# Patient Record
Sex: MALE | Race: WHITE | NOT HISPANIC OR LATINO | Employment: OTHER | URBAN - NONMETROPOLITAN AREA
[De-identification: names, ages, dates, MRNs, and addresses within clinical notes are randomized per-mention and may not be internally consistent; named-entity substitution may affect disease eponyms.]

---

## 2018-07-03 ENCOUNTER — TELEPHONE (OUTPATIENT)
Dept: SURGERY | Facility: CLINIC | Age: 53
End: 2018-07-03

## 2018-07-06 ENCOUNTER — PREP FOR SURGERY (OUTPATIENT)
Dept: OTHER | Facility: HOSPITAL | Age: 53
End: 2018-07-06

## 2018-07-06 DIAGNOSIS — Z12.11 ENCOUNTER FOR SCREENING COLONOSCOPY: Primary | ICD-10-CM

## 2018-07-06 RX ORDER — BISACODYL 5 MG/1
5 TABLET, DELAYED RELEASE ORAL DAILY
Qty: 4 TABLET | Refills: 0 | Status: SHIPPED | OUTPATIENT
Start: 2018-07-06 | End: 2018-10-02 | Stop reason: HOSPADM

## 2018-07-06 RX ORDER — POLYETHYLENE GLYCOL 3350 17 G/17G
238 POWDER, FOR SOLUTION ORAL ONCE
Qty: 14 PACKET | Refills: 0 | Status: SHIPPED | OUTPATIENT
Start: 2018-07-06 | End: 2018-07-06

## 2018-07-17 PROBLEM — Z12.11 ENCOUNTER FOR SCREENING COLONOSCOPY: Status: ACTIVE | Noted: 2018-07-17

## 2018-09-10 ENCOUNTER — TELEPHONE (OUTPATIENT)
Dept: SURGERY | Facility: CLINIC | Age: 53
End: 2018-09-10

## 2018-09-10 NOTE — TELEPHONE ENCOUNTER
Called the patient to remind them of an upcoming appointment with general surgery. I was able to reach to reach the patient. confirmed

## 2018-09-11 ENCOUNTER — TELEPHONE (OUTPATIENT)
Dept: SURGERY | Facility: CLINIC | Age: 53
End: 2018-09-11

## 2018-09-11 RX ORDER — SERTRALINE HYDROCHLORIDE 25 MG/1
25 TABLET, FILM COATED ORAL DAILY
COMMUNITY
End: 2020-10-21 | Stop reason: DRUGHIGH

## 2018-09-11 RX ORDER — LISINOPRIL 40 MG/1
40 TABLET ORAL DAILY
COMMUNITY
End: 2020-10-02

## 2018-10-02 ENCOUNTER — HOSPITAL ENCOUNTER (OUTPATIENT)
Facility: HOSPITAL | Age: 53
Setting detail: HOSPITAL OUTPATIENT SURGERY
Discharge: HOME | End: 2018-10-02
Attending: SURGERY | Admitting: SURGERY

## 2018-10-02 ENCOUNTER — ANESTHESIA (OUTPATIENT)
Dept: GASTROENTEROLOGY | Facility: HOSPITAL | Age: 53
End: 2018-10-02

## 2018-10-02 ENCOUNTER — ANESTHESIA EVENT (OUTPATIENT)
Dept: GASTROENTEROLOGY | Facility: HOSPITAL | Age: 53
End: 2018-10-02

## 2018-10-02 VITALS
DIASTOLIC BLOOD PRESSURE: 79 MMHG | BODY MASS INDEX: 38.36 KG/M2 | RESPIRATION RATE: 18 BRPM | OXYGEN SATURATION: 94 % | HEART RATE: 85 BPM | TEMPERATURE: 97.8 F | SYSTOLIC BLOOD PRESSURE: 122 MMHG | HEIGHT: 69 IN | WEIGHT: 259 LBS

## 2018-10-02 DIAGNOSIS — Z12.11 ENCOUNTER FOR SCREENING COLONOSCOPY: ICD-10-CM

## 2018-10-02 LAB — GLUCOSE BLDC GLUCOMTR-MCNC: 147 MG/DL (ref 70–130)

## 2018-10-02 PROCEDURE — 82962 GLUCOSE BLOOD TEST: CPT

## 2018-10-02 PROCEDURE — S0260 H&P FOR SURGERY: HCPCS | Performed by: SURGERY

## 2018-10-02 PROCEDURE — 25010000002 PROPOFOL 10 MG/ML EMULSION: Performed by: NURSE ANESTHETIST, CERTIFIED REGISTERED

## 2018-10-02 RX ORDER — ONDANSETRON 2 MG/ML
4 INJECTION INTRAMUSCULAR; INTRAVENOUS ONCE AS NEEDED
Status: DISCONTINUED | OUTPATIENT
Start: 2018-10-02 | End: 2018-10-02 | Stop reason: HOSPADM

## 2018-10-02 RX ORDER — LIDOCAINE HYDROCHLORIDE 20 MG/ML
INJECTION, SOLUTION INFILTRATION; PERINEURAL AS NEEDED
Status: DISCONTINUED | OUTPATIENT
Start: 2018-10-02 | End: 2018-10-02 | Stop reason: SURG

## 2018-10-02 RX ORDER — ONDANSETRON 2 MG/ML
4 INJECTION INTRAMUSCULAR; INTRAVENOUS ONCE AS NEEDED
Status: CANCELLED | OUTPATIENT
Start: 2018-10-02 | End: 2018-10-02

## 2018-10-02 RX ORDER — SODIUM CHLORIDE 9 MG/ML
INJECTION, SOLUTION INTRAVENOUS CONTINUOUS PRN
Status: DISCONTINUED | OUTPATIENT
Start: 2018-10-02 | End: 2018-10-02 | Stop reason: SURG

## 2018-10-02 RX ORDER — PROPOFOL 10 MG/ML
VIAL (ML) INTRAVENOUS AS NEEDED
Status: DISCONTINUED | OUTPATIENT
Start: 2018-10-02 | End: 2018-10-02 | Stop reason: SURG

## 2018-10-02 RX ADMIN — SODIUM CHLORIDE: 9 INJECTION, SOLUTION INTRAVENOUS at 12:09

## 2018-10-02 RX ADMIN — PROPOFOL 500 MG: 10 INJECTION, EMULSION INTRAVENOUS at 12:40

## 2018-10-02 RX ADMIN — LIDOCAINE HYDROCHLORIDE 100 MG: 20 INJECTION, SOLUTION INFILTRATION; PERINEURAL at 12:12

## 2018-10-02 NOTE — H&P
[facility]   HISTORY AND PHYSICAL      Name:  Wagner Hayward   Age:  53 y.o.  Sex:  male  :  1965  MRN:  [id number]   Visit Number:  [id number]  Admission Date:  10/2/2018  Date Of Service:  10/02/18  Primary Care Physician:  Gina Rucker DO    Chief Complaint:     I need a colonoscopy    History Of Presenting Illness:      Patient with a history of polyps and 2 prior colonoscopies.  Last colonoscopy 5 years ago.  Here for repeat colonoscopy due to his history of colon polyps.  No significant family history for colon cancer    Review Of Systems:     General ROS: Patient denies any fevers, chills or loss of consciousness.  No complaints of generalized weakness  Psychological ROS: Denies any hallucinations and delusions.  Respiratory ROS: Denies cough or shortness of breath.   Cardiovascular ROS: Denies chest pain or palpitations. No history of exertional chest pain.   Gastrointestinal ROS: Denies nausea and vomiting. Denies any abdominal pain. No diarrhea.   Genito-Urinary ROS: Denies dysuria or hematuria.  Neurological ROS: Denies any focal weakness. No loss of consciousness. Denies any numbness.   Dermatological ROS: Denies any redness or pruritis.     Past Medical History:    Past Medical History:   Diagnosis Date   • Anxiety    • Arthritis    • Colon polyp    • Diabetes mellitus (CMS/HCC)    • H/O exercise stress test    • History of transfusion     NO REACTIONS   • Hypertension    • Kidney stones    • MRSA (methicillin resistant Staphylococcus aureus) infection     8 MONTHS AGO ON NECK-TREATED WITH ABX. PT REPORTS HEALED   • Wears glasses     READING       Past Surgical history:    Past Surgical History:   Procedure Laterality Date   • ANAL FISSURECTOMY     • CHOLECYSTECTOMY     • COLONOSCOPY     • CYSTOSCOPY W/ LASER LITHOTRIPSY         Social History:    Social History     Social History   • Marital status:      Spouse name: N/A   • Number of children: N/A    • Years of education: N/A     Occupational History   • Not on file.     Social History Main Topics   • Smoking status: Former Smoker     Packs/day: 1.00     Years: 17.00     Types: Cigarettes   • Smokeless tobacco: Never Used      Comment: QUIT 20 YEARS AGO   • Alcohol use No   • Drug use: No   • Sexual activity: Defer     Other Topics Concern   • Not on file     Social History Narrative   • No narrative on file       Family History:    History reviewed. No pertinent family history.    Allergies:      Patient has no known allergies.    Home Medications:    Prior to Admission Medications     Prescriptions Last Dose Informant Patient Reported? Taking?    Acetaminophen (TYLENOL ARTHRITIS PAIN PO) 10/1/2018 Self Yes Yes    Take 2 tablets by mouth 2 (Two) Times a Day.    bisacodyl (bisacodyl) 5 MG EC tablet   No Yes    Take 1 tablet by mouth Daily.    bisacodyl (bisacodyl) 5 MG EC tablet   No Yes    Take 1 tablet by mouth Daily.    Dulaglutide (TRULICITY SC) Past Week  Yes Yes    Inject 1 dose under the skin into the appropriate area as directed 1 (One) Time Per Week. PT UNSURE OF DOSAGE    Empagliflozin (JARDIANCE PO) 10/1/2018 Self Yes Yes    Take 1 tablet/day by mouth Daily. PT UNSURE OF DOSAGE    insulin lispro (humaLOG) 100 UNIT/ML injection 10/1/2018 Self Yes Yes    Inject 26 Units under the skin into the appropriate area as directed 2 (Two) Times a Day.    JANUMET  MG per tablet 10/1/2018  No Yes    TAKE 1 TABLET BY MOUTH TWICE DAILY    lisinopril (PRINIVIL,ZESTRIL) 40 MG tablet 10/2/2018 Self Yes Yes    Take 40 mg by mouth Daily.    ONE TOUCH ULTRA TEST test strip 10/1/2018  No Yes    TEST DAILY AS NEEDED    sertraline (ZOLOFT) 25 MG tablet Past Week Self Yes Yes    Take 25 mg by mouth Daily.             ED Medications:    Medications - No data to display    Vital Signs:    Temp:  [97.3 °F (36.3 °C)] 97.3 °F (36.3 °C)  Heart Rate:  [89] 89  Resp:  [18] 18  BP: (161)/(89) 161/89    1    09/11/18  0992    Weight: 117 kg (259 lb)       Body mass index is 38.25 kg/m².    Physical Exam:      General Appearance:  Alert and cooperative, not in any acute distress.   Head:  Atraumatic and normocephalic, without obvious abnormality.   Eyes:          PERRLA, conjunctivae and sclerae normal, no Icterus. No pallor. Extra-occular movements are within normal limits.   Ears:  Ears appear intact with no abnormalities noted.   Respiratory/Lungs:   Breath sounds heard bilaterally equally.  No crackles or wheezing. No Pleural rub or bronchial breathing. Normal respiratory effort.    Cardiovascular/Heart:  Normal S1 and S2,    GI/Abdomen:   No masses, no hepatosplenomegaly. Soft, non-tender, non-distended, no hernia                 Musculoskeletal/ Extremities:   Moves all extremities well   Skin: No bleeding, bruising or rash, no induration   Psychiatric : Alert and oriented ×3.  No depression or anxiety    Neurologic: Cranial nerves 2 - 12 grossly intact, sensation intact, Motor power is normal and equal bilaterally.       EKG:          Labs:    Lab Results (last 24 hours)     Procedure Component Value Units Date/Time    POC Glucose Once [[id number]]  (Abnormal) Collected:  10/02/18 1023    Specimen:  Blood Updated:  10/02/18 1035     Glucose 147 (H) mg/dL      Comment: Serial Number: [id number]:  [id number]             Radiology:    Imaging Results (last 72 hours)     ** No results found for the last 72 hours. **          Assessment:    Screening colonoscopy with history of colon polyps    Plan:     I recommend a screening colonoscopy to the patient.  The patient understands the procedure and the reason for the procedure.  The patient also understands the risks which include but are not limited to bleeding and perforation and they understand the ramifications of these potential complications and wish to proceed.    Elian Reynoso MD  10/02/18  12:12 PM

## 2018-10-02 NOTE — ANESTHESIA PREPROCEDURE EVALUATION
Anesthesia Evaluation     Patient summary reviewed and Nursing notes reviewed   NPO Solid Status: > 8 hours  NPO Liquid Status: > 8 hours           Airway   Mallampati: II  TM distance: >3 FB  Neck ROM: full  No difficulty expected  Dental - normal exam     Pulmonary - normal exam   (+) sleep apnea (multiple risk factors),   Cardiovascular - normal exam    (+) hypertension,       Neuro/Psych  (+) psychiatric history Anxiety,     GI/Hepatic/Renal/Endo    (+) obesity,   diabetes mellitus,     Musculoskeletal     Abdominal  - normal exam    Bowel sounds: normal.   Substance History - negative use     OB/GYN negative ob/gyn ROS         Other   (+) arthritis                     Anesthesia Plan    ASA 2     MAC     intravenous induction   Anesthetic plan, all risks, benefits, and alternatives have been provided, discussed and informed consent has been obtained with: patient.

## 2018-10-02 NOTE — ANESTHESIA POSTPROCEDURE EVALUATION
Patient: Wagner Hayward    Procedure Summary     Date:  10/02/18 Room / Location:  [facility] ENDOSCOPY 2 / [facility] ENDOSCOPY    Anesthesia Start:  1209 Anesthesia Stop:  1243    Procedure:  COLONOSCOPY WITH COLD SNARE POLYPECTOMY (N/A Anus) Diagnosis:       Encounter for screening colonoscopy      (Encounter for screening colonoscopy [Z12.11])    Surgeon:  Elian Reynoso MD Provider:  Kayden Alford CRNA    Anesthesia Type:  MAC ASA Status:  2          Anesthesia Type: MAC  Last vitals  BP   122/79 (10/02/18 1303)   Temp   97.8 °F (36.6 °C) (10/02/18 1243)   Pulse   85 (10/02/18 1303)   Resp   18 (10/02/18 1303)     SpO2   94 % (10/02/18 1303)     Post Anesthesia Care and Evaluation    Patient location during evaluation: PHASE II  Patient participation: complete - patient participated  Level of consciousness: awake  Pain score: 1  Pain management: adequate  Airway patency: patent  Anesthetic complications: No anesthetic complications  PONV Status: controlled  Cardiovascular status: acceptable and stable  Respiratory status: acceptable  Hydration status: acceptable

## 2018-10-05 LAB
LAB AP CASE REPORT: NORMAL
PATH REPORT.FINAL DX SPEC: NORMAL

## 2019-01-14 ENCOUNTER — TRANSCRIBE ORDERS (OUTPATIENT)
Dept: ADMINISTRATIVE | Facility: HOSPITAL | Age: 54
End: 2019-01-14

## 2019-01-14 DIAGNOSIS — I25.10 CAD IN NATIVE ARTERY: Primary | ICD-10-CM

## 2019-01-17 ENCOUNTER — APPOINTMENT (OUTPATIENT)
Dept: NUCLEAR MEDICINE | Facility: HOSPITAL | Age: 54
End: 2019-01-17
Attending: INTERNAL MEDICINE

## 2019-01-17 ENCOUNTER — HOSPITAL ENCOUNTER (OUTPATIENT)
Dept: NUCLEAR MEDICINE | Facility: HOSPITAL | Age: 54
End: 2019-01-17
Attending: INTERNAL MEDICINE

## 2019-01-25 ENCOUNTER — HOSPITAL ENCOUNTER (OUTPATIENT)
Dept: NUCLEAR MEDICINE | Facility: HOSPITAL | Age: 54
Discharge: HOME | End: 2019-01-25
Attending: INTERNAL MEDICINE

## 2019-01-25 DIAGNOSIS — I25.10 CAD IN NATIVE ARTERY: ICD-10-CM

## 2019-01-25 PROCEDURE — A9500 TC99M SESTAMIBI: HCPCS | Performed by: INTERNAL MEDICINE

## 2019-01-25 PROCEDURE — 93017 CV STRESS TEST TRACING ONLY: CPT

## 2019-01-25 PROCEDURE — 0 TECHNETIUM SESTAMIBI: Performed by: INTERNAL MEDICINE

## 2019-01-25 PROCEDURE — 78452 HT MUSCLE IMAGE SPECT MULT: CPT

## 2019-01-25 PROCEDURE — 25010000002 REGADENOSON 0.4 MG/5ML SOLUTION: Performed by: INTERNAL MEDICINE

## 2019-01-25 RX ADMIN — REGADENOSON 0.4 MG: 0.08 INJECTION, SOLUTION INTRAVENOUS at 08:30

## 2019-01-25 RX ADMIN — TECHNETIUM TC 99M SESTAMIBI 1 DOSE: 1 INJECTION INTRAVENOUS at 06:45

## 2019-01-25 RX ADMIN — TECHNETIUM TC 99M SESTAMIBI 1 DOSE: 1 INJECTION INTRAVENOUS at 08:30

## 2019-01-29 LAB
BH CV STRESS COMMENTS STAGE 1: NORMAL
BH CV STRESS DOSE REGADENOSON STAGE 1: 0.4
BH CV STRESS DURATION MIN STAGE 1: 0
BH CV STRESS DURATION SEC STAGE 1: 10
BH CV STRESS PROTOCOL 1: NORMAL
BH CV STRESS RECOVERY BP: NORMAL MMHG
BH CV STRESS RECOVERY HR: 83 BPM
BH CV STRESS STAGE 1: 1
LV EF NUC BP: 53 %
MAXIMAL PREDICTED HEART RATE: 165 BPM
PERCENT MAX PREDICTED HR: 53.33 %
STRESS BASELINE BP: NORMAL MMHG
STRESS BASELINE HR: 73 BPM
STRESS PERCENT HR: 63 %
STRESS POST PEAK BP: NORMAL MMHG
STRESS POST PEAK HR: 88 BPM
STRESS TARGET HR: 140 BPM

## 2020-04-06 ENCOUNTER — TRANSCRIBE ORDERS (OUTPATIENT)
Dept: ADMINISTRATIVE | Facility: HOSPITAL | Age: 55
End: 2020-04-06

## 2020-04-06 DIAGNOSIS — N64.4 MASTODYNIA: Primary | ICD-10-CM

## 2020-04-21 ENCOUNTER — HOSPITAL ENCOUNTER (OUTPATIENT)
Dept: ULTRASOUND IMAGING | Facility: HOSPITAL | Age: 55
Discharge: HOME | End: 2020-04-21

## 2020-04-21 ENCOUNTER — HOSPITAL ENCOUNTER (OUTPATIENT)
Dept: MAMMOGRAPHY | Facility: HOSPITAL | Age: 55
Discharge: HOME | End: 2020-04-21
Admitting: NURSE PRACTITIONER

## 2020-04-21 DIAGNOSIS — N64.4 MASTODYNIA: ICD-10-CM

## 2020-04-21 PROCEDURE — G0279 TOMOSYNTHESIS, MAMMO: HCPCS

## 2020-04-21 PROCEDURE — 76642 ULTRASOUND BREAST LIMITED: CPT

## 2020-04-21 PROCEDURE — 77066 DX MAMMO INCL CAD BI: CPT

## 2020-05-07 ENCOUNTER — APPOINTMENT (OUTPATIENT)
Dept: MAMMOGRAPHY | Facility: HOSPITAL | Age: 55
End: 2020-05-07

## 2020-05-26 RX ORDER — CARVEDILOL 25 MG/1
25 TABLET ORAL 2 TIMES DAILY WITH MEALS
COMMUNITY
Start: 2020-03-31

## 2020-05-26 RX ORDER — MELOXICAM 15 MG/1
TABLET ORAL
COMMUNITY
Start: 2020-04-17 | End: 2020-05-29

## 2020-05-26 RX ORDER — INSULIN LISPRO 100 [IU]/ML
30 INJECTION, SUSPENSION SUBCUTANEOUS 2 TIMES DAILY WITH MEALS
COMMUNITY
Start: 2020-03-30

## 2020-05-26 RX ORDER — SERTRALINE HYDROCHLORIDE 100 MG/1
TABLET, FILM COATED ORAL
COMMUNITY
Start: 2020-04-17 | End: 2020-05-29

## 2020-05-26 RX ORDER — SPIRONOLACTONE AND HYDROCHLOROTHIAZIDE 25; 25 MG/1; MG/1
1 TABLET ORAL DAILY
COMMUNITY
Start: 2020-04-06 | End: 2020-05-29

## 2020-05-26 NOTE — PROGRESS NOTES
Patient: Wagner Hayward    YOB: 1965    Date: 05/29/2020    Primary Care Provider: Gina Rucker DO    Chief Complaint   Patient presents with   • Breast Mass     left        Subjective .     History of present illness: Patient states that he has had about an 8-month history of a left breast mass.  He has had associated pain that is throbbing and can be sharp intermittently.  Touching it makes it worse.  He was in spironolactone for a long time and then stopped the medication and that improved his symptoms.  He restarted the medicine again with recurrence and now for about 8 weeks has stopped the medication without significant relief of his symptoms.  The pain is moderate in intensity.    The following portions of the patient's history were reviewed and updated as appropriate: allergies, current medications, past family history, past medical history, past social history, past surgical history and problem list.    Review of Systems   Constitutional: Negative for chills, fever and unexpected weight change.   HENT: Negative for trouble swallowing and voice change.    Eyes: Negative for visual disturbance.   Respiratory: Negative for apnea, cough, chest tightness, shortness of breath and wheezing.    Cardiovascular: Positive for chest pain and leg swelling. Negative for palpitations.   Gastrointestinal: Negative for abdominal distention, abdominal pain, anal bleeding, blood in stool, constipation, diarrhea, nausea, rectal pain and vomiting.   Endocrine: Negative for cold intolerance and heat intolerance.   Genitourinary: Negative for difficulty urinating, dysuria, flank pain, scrotal swelling and testicular pain.   Musculoskeletal: Negative for back pain, gait problem and joint swelling.   Skin: Negative for color change, rash and wound.   Neurological: Negative for dizziness, syncope, speech difficulty, weakness, numbness and headaches.   Hematological: Negative for adenopathy. Does not  bruise/bleed easily.   Psychiatric/Behavioral: Negative for confusion. The patient is not nervous/anxious.        History:  Past Medical History:   Diagnosis Date   • Anxiety    • Arthritis    • Colon polyp    • Diabetes mellitus (CMS/HCC)    • H/O exercise stress test    • History of transfusion     NO REACTIONS   • Hypertension    • Kidney stones    • MRSA (methicillin resistant Staphylococcus aureus) infection     8 MONTHS AGO ON NECK-TREATED WITH ABX. PT REPORTS HEALED   • Wears glasses     READING          Past Surgical History:   Procedure Laterality Date   • ANAL FISSURECTOMY     • CHOLECYSTECTOMY     • COLONOSCOPY     • COLONOSCOPY N/A 10/2/2018    Procedure: COLONOSCOPY WITH COLD SNARE POLYPECTOMY;  Surgeon: Elian Reynoso MD;  Location: [facility] ENDOSCOPY;  Service: Gastroenterology   • CYSTOSCOPY W/ LASER LITHOTRIPSY         Family History   Problem Relation Age of Onset   • No Known Problems Mother    • Cancer Father         prostate   • Diabetes Father    • Diabetes Sister    • Hypertension Sister    • Hypertension Brother    • Hypertension Brother        Social History     Tobacco Use   • Smoking status: Former Smoker     Packs/day: 1.00     Years: 17.00     Pack years: 17.00     Types: Cigarettes   • Smokeless tobacco: Never Used   • Tobacco comment: QUIT 20 YEARS AGO   Substance Use Topics   • Alcohol use: No   • Drug use: No       Allergies:  No Known Allergies    Medications:     Current Outpatient Medications:   •  carvedilol (COREG) 25 MG tablet, Take 25 mg by mouth 2 (Two) Times a Day With Meals., Disp: , Rfl:   •  HUMALOG MIX 75/25 KWIKPEN (75-25) 100 UNIT/ML suspension pen-injector pen, , Disp: , Rfl:   •  hydroCHLOROthiazide (HYDRODIURIL) 25 MG tablet, , Disp: , Rfl:   •  ibuprofen (ADVIL,MOTRIN) 800 MG tablet, TAKE 1 TABLET BY ORAL ROUTE 2-3 TIMES EVERY DAY WITH FOOD., Disp: , Rfl:   •  insulin lispro (humaLOG) 100 UNIT/ML injection, Inject 26 Units under the skin into the appropriate area  "as directed 2 (Two) Times a Day., Disp: , Rfl:   •  metFORMIN (GLUCOPHAGE) 850 MG tablet, , Disp: , Rfl:   •  pregabalin (LYRICA) 150 MG capsule, TK 1 C PO BID, Disp: , Rfl:   •  sertraline (ZOLOFT) 25 MG tablet, Take 25 mg by mouth Daily., Disp: , Rfl:   •  Acetaminophen (TYLENOL ARTHRITIS PAIN PO), Take 2 tablets by mouth 2 (Two) Times a Day., Disp: , Rfl:   •  Dulaglutide (TRULICITY SC), Inject 1 dose under the skin into the appropriate area as directed 1 (One) Time Per Week. PT UNSURE OF DOSAGE, Disp: , Rfl:   •  Empagliflozin (JARDIANCE PO), Take 1 tablet/day by mouth Daily. PT UNSURE OF DOSAGE, Disp: , Rfl:   •  JANUMET  MG per tablet, TAKE 1 TABLET BY MOUTH TWICE DAILY, Disp: 60 tablet, Rfl: 11  •  lisinopril (PRINIVIL,ZESTRIL) 40 MG tablet, Take 40 mg by mouth Daily., Disp: , Rfl:   •  ONE TOUCH ULTRA TEST test strip, TEST DAILY AS NEEDED, Disp: 100 each, Rfl: 3    Objective     Vital Signs:   Vitals:    05/29/20 0853   BP: 152/100   Pulse: 72   Temp: 98.2 °F (36.8 °C)   TempSrc: Temporal   SpO2: 98%   Weight: (!) 139 kg (305 lb 12.8 oz)   Height: 175.3 cm (69\")       Physical Exam:     General Appearance:    Alert, cooperative, in no acute distress   Head:    Normocephalic, without obvious abnormality, atraumatic   Eyes:            Normal.  No scleral icterus.  PERRLA    Lungs:     Clear to auscultation,respirations regular, even and                  unlabored    Heart:    Regular rhythm and normal rate, normal S1 and S2, no            murmur   Abdomen:     Normal bowel sounds, no masses, no organomegaly, soft        non-tender, non-distended, no guarding,    Extremities:   Moves all extremities well, no edema, no cyanosis, no             redness   Skin:   No bleeding, bruising or rash   Neurologic:   Normal without gross deficits.   Psychiatric: No evidence of depression or anxiety   Breast exam: Patient has evidence of bilateral gynecomastia.  Greater on the left side.  Some tenderness associated " with the gynecomastia on the left side.         Results Review:   I reviewed the patient's new clinical results.  Mammogram and ultrasound were reviewed    Review of Systems was reviewed and confirmed as accurate as documented by the MA.    Assessment / Plan:    1. Gynecomastia        Patient with gynecomastia and pain associated with this mainly on the left side.  Benign findings on ultrasound and mammogram.  At this time of given the patient the option of observation versus resection.  I explained to him that with resection pain may not resolve now also discussed with him the possible risks as well.  With observation symptoms may improve with additional time but certainly this can be ongoing and it is difficult to know exactly how the pain will continue over time.  At this time patient wishes to observe and I think this is reasonable.  I will follow him up in 1 month.  Continue ibuprofen.  Tylenol can be added as well.  Certainly if his symptoms worsen or he changes his mind he will follow-up with me sooner.    Electronically signed by Elian Reynoso MD  05/29/20  09:34        Portions of this note have been scribed for Elian Reynoso MD by Dayana Khan 5/29/2020  09:34

## 2020-05-29 ENCOUNTER — OFFICE VISIT (OUTPATIENT)
Dept: SURGERY | Facility: CLINIC | Age: 55
End: 2020-05-29

## 2020-05-29 VITALS
HEIGHT: 69 IN | BODY MASS INDEX: 45.29 KG/M2 | WEIGHT: 305.8 LBS | HEART RATE: 72 BPM | TEMPERATURE: 98.2 F | OXYGEN SATURATION: 98 % | DIASTOLIC BLOOD PRESSURE: 100 MMHG | SYSTOLIC BLOOD PRESSURE: 152 MMHG

## 2020-05-29 DIAGNOSIS — N62 GYNECOMASTIA: Primary | ICD-10-CM

## 2020-05-29 PROCEDURE — 99214 OFFICE O/P EST MOD 30 MIN: CPT | Performed by: SURGERY

## 2020-05-29 RX ORDER — IBUPROFEN 800 MG/1
TABLET ORAL
COMMUNITY
Start: 2020-04-27 | End: 2020-07-24

## 2020-05-29 RX ORDER — PREGABALIN 150 MG/1
CAPSULE ORAL
COMMUNITY
Start: 2020-04-29 | End: 2020-10-02

## 2020-05-29 RX ORDER — HYDROCHLOROTHIAZIDE 25 MG/1
25 TABLET ORAL DAILY
COMMUNITY
Start: 2020-05-27 | End: 2020-10-02

## 2020-07-09 ENCOUNTER — APPOINTMENT (OUTPATIENT)
Dept: CT IMAGING | Facility: HOSPITAL | Age: 55
End: 2020-07-09

## 2020-07-09 ENCOUNTER — HOSPITAL ENCOUNTER (EMERGENCY)
Facility: HOSPITAL | Age: 55
Discharge: HOME | End: 2020-07-09
Attending: EMERGENCY MEDICINE | Admitting: EMERGENCY MEDICINE

## 2020-07-09 VITALS
RESPIRATION RATE: 18 BRPM | DIASTOLIC BLOOD PRESSURE: 86 MMHG | OXYGEN SATURATION: 95 % | WEIGHT: 299 LBS | HEART RATE: 75 BPM | HEIGHT: 69 IN | BODY MASS INDEX: 44.28 KG/M2 | SYSTOLIC BLOOD PRESSURE: 130 MMHG | TEMPERATURE: 97.7 F

## 2020-07-09 DIAGNOSIS — R10.9 ABDOMINAL PAIN, UNSPECIFIED ABDOMINAL LOCATION: Primary | ICD-10-CM

## 2020-07-09 LAB
ALBUMIN SERPL-MCNC: 4.2 G/DL (ref 3.5–5.2)
ALBUMIN/GLOB SERPL: 1.2 G/DL
ALP SERPL-CCNC: 105 U/L (ref 39–117)
ALT SERPL W P-5'-P-CCNC: 17 U/L (ref 1–41)
ANION GAP SERPL CALCULATED.3IONS-SCNC: 13.8 MMOL/L (ref 5–15)
AST SERPL-CCNC: 18 U/L (ref 1–40)
BACTERIA UR QL AUTO: ABNORMAL /HPF
BASOPHILS # BLD AUTO: 0.08 10*3/MM3 (ref 0–0.2)
BASOPHILS NFR BLD AUTO: 0.8 % (ref 0–1.5)
BILIRUB SERPL-MCNC: 0.2 MG/DL (ref 0–1.2)
BILIRUB UR QL STRIP: NEGATIVE
BUN SERPL-MCNC: 19 MG/DL (ref 6–20)
BUN/CREAT SERPL: 14.3 (ref 7–25)
CALCIUM SPEC-SCNC: 9.6 MG/DL (ref 8.6–10.5)
CHLORIDE SERPL-SCNC: 94 MMOL/L (ref 98–107)
CLARITY UR: CLEAR
CO2 SERPL-SCNC: 23.2 MMOL/L (ref 22–29)
COLOR UR: YELLOW
CREAT SERPL-MCNC: 1.33 MG/DL (ref 0.76–1.27)
DEPRECATED RDW RBC AUTO: 42.6 FL (ref 37–54)
EOSINOPHIL # BLD AUTO: 0.36 10*3/MM3 (ref 0–0.4)
EOSINOPHIL NFR BLD AUTO: 3.4 % (ref 0.3–6.2)
ERYTHROCYTE [DISTWIDTH] IN BLOOD BY AUTOMATED COUNT: 13.6 % (ref 12.3–15.4)
GFR SERPL CREATININE-BSD FRML MDRD: 56 ML/MIN/1.73
GLOBULIN UR ELPH-MCNC: 3.4 GM/DL
GLUCOSE SERPL-MCNC: 398 MG/DL (ref 65–99)
GLUCOSE UR STRIP-MCNC: ABNORMAL MG/DL
HCT VFR BLD AUTO: 45.8 % (ref 37.5–51)
HGB BLD-MCNC: 15.4 G/DL (ref 13–17.7)
HGB UR QL STRIP.AUTO: NEGATIVE
HOLD SPECIMEN: NORMAL
HOLD SPECIMEN: NORMAL
HYALINE CASTS UR QL AUTO: ABNORMAL /LPF
IMM GRANULOCYTES # BLD AUTO: 0.05 10*3/MM3 (ref 0–0.05)
IMM GRANULOCYTES NFR BLD AUTO: 0.5 % (ref 0–0.5)
KETONES UR QL STRIP: NEGATIVE
LEUKOCYTE ESTERASE UR QL STRIP.AUTO: NEGATIVE
LIPASE SERPL-CCNC: 29 U/L (ref 13–60)
LYMPHOCYTES # BLD AUTO: 3.25 10*3/MM3 (ref 0.7–3.1)
LYMPHOCYTES NFR BLD AUTO: 31.1 % (ref 19.6–45.3)
MCH RBC QN AUTO: 28.8 PG (ref 26.6–33)
MCHC RBC AUTO-ENTMCNC: 33.6 G/DL (ref 31.5–35.7)
MCV RBC AUTO: 85.6 FL (ref 79–97)
MONOCYTES # BLD AUTO: 0.8 10*3/MM3 (ref 0.1–0.9)
MONOCYTES NFR BLD AUTO: 7.6 % (ref 5–12)
MUCOUS THREADS URNS QL MICRO: ABNORMAL /HPF
NEUTROPHILS NFR BLD AUTO: 5.92 10*3/MM3 (ref 1.7–7)
NEUTROPHILS NFR BLD AUTO: 56.6 % (ref 42.7–76)
NITRITE UR QL STRIP: NEGATIVE
NRBC BLD AUTO-RTO: 0 /100 WBC (ref 0–0.2)
PH UR STRIP.AUTO: <=5 [PH] (ref 5–8)
PLATELET # BLD AUTO: 295 10*3/MM3 (ref 140–450)
PMV BLD AUTO: 10.6 FL (ref 6–12)
POTASSIUM SERPL-SCNC: 4.4 MMOL/L (ref 3.5–5.2)
PROT SERPL-MCNC: 7.6 G/DL (ref 6–8.5)
PROT UR QL STRIP: ABNORMAL
RBC # BLD AUTO: 5.35 10*6/MM3 (ref 4.14–5.8)
RBC # UR: ABNORMAL /HPF
REF LAB TEST METHOD: ABNORMAL
SODIUM SERPL-SCNC: 131 MMOL/L (ref 136–145)
SP GR UR STRIP: >=1.03 (ref 1–1.03)
SQUAMOUS #/AREA URNS HPF: ABNORMAL /HPF
UROBILINOGEN UR QL STRIP: ABNORMAL
WBC # BLD AUTO: 10.46 10*3/MM3 (ref 3.4–10.8)
WBC UR QL AUTO: ABNORMAL /HPF
WHOLE BLOOD HOLD SPECIMEN: NORMAL
WHOLE BLOOD HOLD SPECIMEN: NORMAL

## 2020-07-09 PROCEDURE — 81001 URINALYSIS AUTO W/SCOPE: CPT | Performed by: EMERGENCY MEDICINE

## 2020-07-09 PROCEDURE — 99283 EMERGENCY DEPT VISIT LOW MDM: CPT

## 2020-07-09 PROCEDURE — 96374 THER/PROPH/DIAG INJ IV PUSH: CPT

## 2020-07-09 PROCEDURE — 80053 COMPREHEN METABOLIC PANEL: CPT | Performed by: EMERGENCY MEDICINE

## 2020-07-09 PROCEDURE — 25010000002 KETOROLAC TROMETHAMINE PER 15 MG: Performed by: EMERGENCY MEDICINE

## 2020-07-09 PROCEDURE — 83690 ASSAY OF LIPASE: CPT | Performed by: EMERGENCY MEDICINE

## 2020-07-09 PROCEDURE — 85025 COMPLETE CBC W/AUTO DIFF WBC: CPT

## 2020-07-09 PROCEDURE — 74176 CT ABD & PELVIS W/O CONTRAST: CPT

## 2020-07-09 RX ORDER — CYCLOBENZAPRINE HCL 5 MG
5 TABLET ORAL 3 TIMES DAILY PRN
Qty: 12 TABLET | Refills: 0 | Status: SHIPPED | OUTPATIENT
Start: 2020-07-09 | End: 2020-10-02

## 2020-07-09 RX ORDER — KETOROLAC TROMETHAMINE 30 MG/ML
30 INJECTION, SOLUTION INTRAMUSCULAR; INTRAVENOUS ONCE
Status: COMPLETED | OUTPATIENT
Start: 2020-07-09 | End: 2020-07-09

## 2020-07-09 RX ORDER — SODIUM CHLORIDE 0.9 % (FLUSH) 0.9 %
10 SYRINGE (ML) INJECTION AS NEEDED
Status: DISCONTINUED | OUTPATIENT
Start: 2020-07-09 | End: 2020-07-09 | Stop reason: HOSPADM

## 2020-07-09 RX ORDER — IBUPROFEN 600 MG/1
600 TABLET ORAL EVERY 6 HOURS PRN
Qty: 30 TABLET | Refills: 0 | Status: SHIPPED | OUTPATIENT
Start: 2020-07-09 | End: 2020-10-05 | Stop reason: HOSPADM

## 2020-07-09 RX ADMIN — KETOROLAC TROMETHAMINE 30 MG: 30 INJECTION, SOLUTION INTRAMUSCULAR at 10:26

## 2020-07-09 RX ADMIN — SODIUM CHLORIDE 500 ML: 9 INJECTION, SOLUTION INTRAVENOUS at 10:26

## 2020-07-09 NOTE — ED PROVIDER NOTES
Subjective   54-year-old male presents to the ED with a chief complaint of abdominal pain.  The patient is complaining of right lower quadrant abdominal pain that radiates into his right low back and up his right flank.  States that the pain is been there for approximately 2 months but has been much worse over the last week.  He has had associated diarrhea.  No nausea or vomiting.  No cough shortness of breath or wheeze.  No testicular pain.  No hematuria or dysuria.  No difficulty with urination. No prior treatments or alleviating factors. No other complaints.           Review of Systems   Constitutional: Negative for fatigue and fever.   Gastrointestinal: Positive for abdominal pain and diarrhea. Negative for nausea and vomiting.   Genitourinary: Positive for flank pain. Negative for difficulty urinating, hematuria, testicular pain and urgency.   Musculoskeletal: Positive for back pain.   All other systems reviewed and are negative.      Past Medical History:   Diagnosis Date   • Anxiety    • Arthritis    • Colon polyp    • Diabetes mellitus (CMS/HCC)    • H/O exercise stress test    • History of transfusion     NO REACTIONS   • Hypertension    • Kidney stones    • MRSA (methicillin resistant Staphylococcus aureus) infection     8 MONTHS AGO ON NECK-TREATED WITH ABX. PT REPORTS HEALED   • Wears glasses     READING       No Known Allergies    Past Surgical History:   Procedure Laterality Date   • ANAL FISSURECTOMY     • CHOLECYSTECTOMY     • COLONOSCOPY     • COLONOSCOPY N/A 10/2/2018    Procedure: COLONOSCOPY WITH COLD SNARE POLYPECTOMY;  Surgeon: Elian Reynoso MD;  Location: [facility] ENDOSCOPY;  Service: Gastroenterology   • CYSTOSCOPY W/ LASER LITHOTRIPSY         Family History   Problem Relation Age of Onset   • No Known Problems Mother    • Cancer Father         prostate   • Diabetes Father    • Diabetes Sister    • Hypertension Sister    • Hypertension Brother    • Hypertension Brother        Social  History     Socioeconomic History   • Marital status:      Spouse name: Not on file   • Number of children: Not on file   • Years of education: Not on file   • Highest education level: Not on file   Tobacco Use   • Smoking status: Former Smoker     Packs/day: 1.00     Years: 17.00     Pack years: 17.00     Types: Cigarettes   • Smokeless tobacco: Never Used   • Tobacco comment: QUIT 20 YEARS AGO   Substance and Sexual Activity   • Alcohol use: No   • Drug use: No   • Sexual activity: Defer           Objective   Physical Exam   Constitutional: He is oriented to person, place, and time. He appears well-developed and well-nourished. No distress.   HENT:   Head: Normocephalic and atraumatic.   Nose: Nose normal.   Eyes: Pupils are equal, round, and reactive to light. Conjunctivae and EOM are normal.   Cardiovascular: Normal rate, regular rhythm and intact distal pulses.   Pulmonary/Chest: Effort normal and breath sounds normal. No respiratory distress.   Abdominal: Soft. He exhibits no distension. There is tenderness.   Right lower quadrant tenderness to palpation   Musculoskeletal: He exhibits no edema or deformity.   Neurological: He is alert and oriented to person, place, and time. No cranial nerve deficit. Coordination normal.   Skin: He is not diaphoretic.   Nursing note and vitals reviewed.      Procedures           ED Course                                           MDM  Patient presents complaining of abdominal pain.  Laboratories also reassuring.  CT abdomen pelvis negative for acute process.  Urinalysis negative for infection or hematuria.  Suspect possible abdominal wall strain versus other etiology.  Feel that he is appropriate for discharge to follow-up as needed.  He is agreeable to this plan.      Final diagnoses:   Abdominal pain, unspecified abdominal location            Miguel Perdomo, DO  07/09/20 [id number]

## 2020-07-24 ENCOUNTER — OFFICE VISIT (OUTPATIENT)
Dept: SURGERY | Facility: CLINIC | Age: 55
End: 2020-07-24

## 2020-07-24 VITALS
DIASTOLIC BLOOD PRESSURE: 80 MMHG | OXYGEN SATURATION: 98 % | BODY MASS INDEX: 42.98 KG/M2 | SYSTOLIC BLOOD PRESSURE: 132 MMHG | TEMPERATURE: 97.7 F | WEIGHT: 290.2 LBS | HEIGHT: 69 IN | HEART RATE: 93 BPM

## 2020-07-24 DIAGNOSIS — B02.29 POST HERPETIC NEURALGIA: Primary | ICD-10-CM

## 2020-07-24 PROCEDURE — 99214 OFFICE O/P EST MOD 30 MIN: CPT | Performed by: SURGERY

## 2020-07-24 RX ORDER — GABAPENTIN 100 MG/1
100 CAPSULE ORAL 2 TIMES DAILY
Qty: 28 CAPSULE | Refills: 1 | Status: SHIPPED | OUTPATIENT
Start: 2020-07-24 | End: 2020-10-02

## 2020-07-24 NOTE — PROGRESS NOTES
Patient: Wagner Hayward    YOB: 1965    Date: 07/24/2020    Primary Care Provider: Gina Rucker DO    Chief Complaint   Patient presents with   • Abdominal Pain       SUBJECTIVE:    History of present illness: Patient states that he has had a 5-week history of pain in the right lower back region that now over the last 2 weeks has progressed and radiated around to the umbilicus.  He states that it can be a burning and a pushing feeling.  Moderate to severe in intensity.  It waxes and wanes in intensity.  Eating seems to make it worse sometimes.  He has had some nausea.  Diarrhea after having taken some antibiotics.  Of note he was diagnosed with shingles about 5 weeks ago.    The following portions of the patient's history were reviewed and updated as appropriate: allergies, current medications, past family history, past medical history, past social history, past surgical history and problem list.      Review of Systems   Constitutional: Negative for chills, fever and unexpected weight change.   HENT: Negative for trouble swallowing and voice change.    Eyes: Negative for visual disturbance.   Respiratory: Negative for apnea, cough, chest tightness, shortness of breath and wheezing.    Cardiovascular: Negative for chest pain, palpitations and leg swelling.   Gastrointestinal: Positive for abdominal pain, diarrhea and nausea. Negative for abdominal distention, anal bleeding, blood in stool, constipation, rectal pain and vomiting.   Endocrine: Negative for cold intolerance and heat intolerance.   Genitourinary: Negative for difficulty urinating, dysuria, flank pain, scrotal swelling and testicular pain.   Musculoskeletal: Negative for back pain, gait problem and joint swelling.   Skin: Negative for color change, rash and wound.   Neurological: Negative for dizziness, syncope, speech difficulty, weakness, numbness and headaches.   Hematological: Negative for adenopathy. Does not bruise/bleed  easily.   Psychiatric/Behavioral: Negative for confusion. The patient is not nervous/anxious.        Allergies:  No Known Allergies    Medications:    Current Outpatient Medications:   •  Acetaminophen (TYLENOL ARTHRITIS PAIN PO), Take 2 tablets by mouth 2 (Two) Times a Day., Disp: , Rfl:   •  HUMALOG MIX 75/25 KWIKPEN (75-25) 100 UNIT/ML suspension pen-injector pen, , Disp: , Rfl:   •  hydroCHLOROthiazide (HYDRODIURIL) 25 MG tablet, , Disp: , Rfl:   •  ibuprofen (ADVIL,MOTRIN) 600 MG tablet, Take 1 tablet by mouth Every 6 (Six) Hours As Needed for Mild Pain ., Disp: 30 tablet, Rfl: 0  •  insulin lispro (humaLOG) 100 UNIT/ML injection, Inject 26 Units under the skin into the appropriate area as directed 2 (Two) Times a Day., Disp: , Rfl:   •  metFORMIN (GLUCOPHAGE) 850 MG tablet, , Disp: , Rfl:   •  sertraline (ZOLOFT) 25 MG tablet, Take 25 mg by mouth Daily., Disp: , Rfl:   •  carvedilol (COREG) 25 MG tablet, Take 25 mg by mouth 2 (Two) Times a Day With Meals., Disp: , Rfl:   •  cyclobenzaprine (FLEXERIL) 5 MG tablet, Take 1 tablet by mouth 3 (Three) Times a Day As Needed for Muscle Spasms., Disp: 12 tablet, Rfl: 0  •  Dulaglutide (TRULICITY SC), Inject 1 dose under the skin into the appropriate area as directed 1 (One) Time Per Week. PT UNSURE OF DOSAGE, Disp: , Rfl:   •  Empagliflozin (JARDIANCE PO), Take 1 tablet/day by mouth Daily. PT UNSURE OF DOSAGE, Disp: , Rfl:   •  gabapentin (NEURONTIN) 100 MG capsule, Take 1 capsule by mouth 2 (two) times a day., Disp: 28 capsule, Rfl: 1  •  JANUMET  MG per tablet, TAKE 1 TABLET BY MOUTH TWICE DAILY, Disp: 60 tablet, Rfl: 11  •  lisinopril (PRINIVIL,ZESTRIL) 40 MG tablet, Take 40 mg by mouth Daily., Disp: , Rfl:   •  ONE TOUCH ULTRA TEST test strip, TEST DAILY AS NEEDED, Disp: 100 each, Rfl: 3  •  pregabalin (LYRICA) 150 MG capsule, TK 1 C PO BID, Disp: , Rfl:     History:  Past Medical History:   Diagnosis Date   • Anxiety    • Arthritis    • Colon polyp    •  "Diabetes mellitus (CMS/HCC)    • H/O exercise stress test    • History of transfusion     NO REACTIONS   • Hypertension    • Kidney stones    • MRSA (methicillin resistant Staphylococcus aureus) infection     8 MONTHS AGO ON NECK-TREATED WITH ABX. PT REPORTS HEALED   • Wears glasses     READING       Past Surgical History:   Procedure Laterality Date   • ANAL FISSURECTOMY     • CHOLECYSTECTOMY     • COLONOSCOPY     • COLONOSCOPY N/A 10/2/2018    Procedure: COLONOSCOPY WITH COLD SNARE POLYPECTOMY;  Surgeon: Elian Reynoso MD;  Location: [facility] ENDOSCOPY;  Service: Gastroenterology   • CYSTOSCOPY W/ LASER LITHOTRIPSY         Family History   Problem Relation Age of Onset   • No Known Problems Mother    • Cancer Father         prostate   • Diabetes Father    • Diabetes Sister    • Hypertension Sister    • Hypertension Brother    • Hypertension Brother        Social History     Tobacco Use   • Smoking status: Former Smoker     Packs/day: 1.00     Years: 17.00     Pack years: 17.00     Types: Cigarettes   • Smokeless tobacco: Never Used   • Tobacco comment: QUIT 20 YEARS AGO   Substance Use Topics   • Alcohol use: No   • Drug use: No        OBJECTIVE:    Vital Signs:   Vitals:    07/24/20 0914   BP: 132/80   Pulse: 93   Temp: 97.7 °F (36.5 °C)   SpO2: 98%   Weight: 132 kg (290 lb 3.2 oz)   Height: 175.3 cm (69\")       Physical Exam:   General Appearance:    Alert, cooperative, in no acute distress   Head:    Normocephalic, without obvious abnormality, atraumatic   Eyes:            Normal.  No scleral icterus.  PERRLA    Lungs:     Clear to auscultation,respirations regular, even and                  unlabored    Heart:    Regular rhythm and normal rate, normal S1 and S2, no            murmur   Abdomen:     Normal bowel sounds, no masses, no organomegaly, soft        non-tender, non-distended, no guarding,    Extremities:   Moves all extremities well, no edema, no cyanosis, no             redness   Skin:  Has " tenderness along what appears to be the T10 or T12 dermatome and appears more cutaneous but without lesions.  No obvious intra-abdominal tenderness as above.   Neurologic:   Normal without gross deficits.   Psychiatric: No evidence of depression or anxiety        Results Review:   I reviewed the patient's new clinical results.  Labs and recent CT were reviewed.  I personally reviewed the films and I agree with the interpretation    Review of Systems was reviewed and confirmed as accurate as documented by the MA.    ASSESSMENT/PLAN:    1. Post herpetic neuralgia      No obvious intra-abdominal process on CT and his abdominal exam is rather benign.  Given his history of recent shingles I suspect postherpetic neuralgia given that it is localized to the specific dermatome mentioned above.  I will try some gabapentin and see him in 1 week.  He is agreeable with this.  He understands that this may take some time to resolve.        Electronically signed by Elian Reynoso MD  07/24/20

## 2020-07-30 ENCOUNTER — TELEPHONE (OUTPATIENT)
Dept: SURGERY | Facility: CLINIC | Age: 55
End: 2020-07-30

## 2020-09-17 ENCOUNTER — OFFICE VISIT (OUTPATIENT)
Dept: SURGERY | Facility: CLINIC | Age: 55
End: 2020-09-17

## 2020-09-17 VITALS
BODY MASS INDEX: 43.25 KG/M2 | SYSTOLIC BLOOD PRESSURE: 122 MMHG | TEMPERATURE: 98.2 F | HEIGHT: 69 IN | RESPIRATION RATE: 18 BRPM | OXYGEN SATURATION: 96 % | HEART RATE: 84 BPM | DIASTOLIC BLOOD PRESSURE: 86 MMHG | WEIGHT: 292 LBS

## 2020-09-17 DIAGNOSIS — R10.12 LEFT UPPER QUADRANT PAIN: Primary | ICD-10-CM

## 2020-09-17 PROCEDURE — 99214 OFFICE O/P EST MOD 30 MIN: CPT | Performed by: SURGERY

## 2020-09-17 RX ORDER — HYDRALAZINE HYDROCHLORIDE 50 MG/1
50 TABLET, FILM COATED ORAL 2 TIMES DAILY
COMMUNITY
Start: 2020-09-14

## 2020-09-17 RX ORDER — PANTOPRAZOLE SODIUM 40 MG/1
40 TABLET, DELAYED RELEASE ORAL DAILY
Qty: 30 TABLET | Refills: 0 | Status: SHIPPED | OUTPATIENT
Start: 2020-09-17 | End: 2020-10-21 | Stop reason: ALTCHOICE

## 2020-09-17 RX ORDER — SERTRALINE HYDROCHLORIDE 100 MG/1
TABLET, FILM COATED ORAL DAILY
COMMUNITY
Start: 2020-08-20 | End: 2020-09-17 | Stop reason: DRUGHIGH

## 2020-09-21 DIAGNOSIS — Z01.818 PREOP TESTING: Primary | ICD-10-CM

## 2020-09-21 RX ORDER — BISACODYL 5 MG
TABLET, DELAYED RELEASE (ENTERIC COATED) ORAL
Qty: 4 TABLET | Refills: 0 | Status: SHIPPED | OUTPATIENT
Start: 2020-09-21 | End: 2020-10-05 | Stop reason: HOSPADM

## 2020-09-21 RX ORDER — POLYETHYLENE GLYCOL 3350 17 G/17G
POWDER, FOR SOLUTION ORAL
Qty: 238 G | Refills: 0 | Status: SHIPPED | OUTPATIENT
Start: 2020-09-21 | End: 2020-10-02

## 2020-09-22 ENCOUNTER — TELEPHONE (OUTPATIENT)
Dept: SURGERY | Facility: CLINIC | Age: 55
End: 2020-09-22

## 2020-09-22 DIAGNOSIS — R10.12 LEFT UPPER QUADRANT ABDOMINAL PAIN: Primary | ICD-10-CM

## 2020-09-22 RX ORDER — TRAMADOL HYDROCHLORIDE 50 MG/1
50 TABLET ORAL 2 TIMES DAILY PRN
Qty: 20 TABLET | Refills: 0 | Status: SHIPPED | OUTPATIENT
Start: 2020-09-22 | End: 2020-10-02

## 2020-10-01 ENCOUNTER — LAB (OUTPATIENT)
Dept: LAB | Facility: HOSPITAL | Age: 55
End: 2020-10-01

## 2020-10-01 DIAGNOSIS — Z01.818 PREOP TESTING: ICD-10-CM

## 2020-10-01 PROCEDURE — C9803 HOPD COVID-19 SPEC COLLECT: HCPCS

## 2020-10-01 PROCEDURE — U0004 COV-19 TEST NON-CDC HGH THRU: HCPCS

## 2020-10-02 ENCOUNTER — PREP FOR SURGERY (OUTPATIENT)
Dept: OTHER | Facility: HOSPITAL | Age: 55
End: 2020-10-02

## 2020-10-02 DIAGNOSIS — R10.12 LEFT UPPER QUADRANT ABDOMINAL PAIN: Primary | ICD-10-CM

## 2020-10-02 LAB — SARS-COV-2 RNA NOSE QL NAA+PROBE: NOT DETECTED

## 2020-10-02 RX ORDER — SODIUM CHLORIDE, SODIUM LACTATE, POTASSIUM CHLORIDE, CALCIUM CHLORIDE 600; 310; 30; 20 MG/100ML; MG/100ML; MG/100ML; MG/100ML
50 INJECTION, SOLUTION INTRAVENOUS CONTINUOUS
Status: CANCELLED | OUTPATIENT
Start: 2020-10-05

## 2020-10-02 RX ORDER — SODIUM CHLORIDE 0.9 % (FLUSH) 0.9 %
10 SYRINGE (ML) INJECTION AS NEEDED
Status: CANCELLED | OUTPATIENT
Start: 2020-10-02

## 2020-10-02 RX ORDER — SODIUM CHLORIDE 0.9 % (FLUSH) 0.9 %
3 SYRINGE (ML) INJECTION EVERY 12 HOURS SCHEDULED
Status: CANCELLED | OUTPATIENT
Start: 2020-10-05

## 2020-10-05 ENCOUNTER — ANESTHESIA EVENT (OUTPATIENT)
Dept: GASTROENTEROLOGY | Facility: HOSPITAL | Age: 55
End: 2020-10-05

## 2020-10-05 ENCOUNTER — HOSPITAL ENCOUNTER (OUTPATIENT)
Facility: HOSPITAL | Age: 55
Setting detail: HOSPITAL OUTPATIENT SURGERY
Discharge: HOME | End: 2020-10-05
Attending: SURGERY | Admitting: SURGERY

## 2020-10-05 ENCOUNTER — ANESTHESIA (OUTPATIENT)
Dept: GASTROENTEROLOGY | Facility: HOSPITAL | Age: 55
End: 2020-10-05

## 2020-10-05 VITALS
TEMPERATURE: 98.1 F | DIASTOLIC BLOOD PRESSURE: 89 MMHG | HEIGHT: 68 IN | RESPIRATION RATE: 17 BRPM | SYSTOLIC BLOOD PRESSURE: 131 MMHG | WEIGHT: 286 LBS | BODY MASS INDEX: 43.35 KG/M2 | HEART RATE: 80 BPM | OXYGEN SATURATION: 97 %

## 2020-10-05 DIAGNOSIS — R10.12 LEFT UPPER QUADRANT ABDOMINAL PAIN: ICD-10-CM

## 2020-10-05 LAB — GLUCOSE BLDC GLUCOMTR-MCNC: 164 MG/DL (ref 70–130)

## 2020-10-05 PROCEDURE — 82962 GLUCOSE BLOOD TEST: CPT

## 2020-10-05 PROCEDURE — 25010000002 PROPOFOL 200 MG/20ML EMULSION: Performed by: NURSE ANESTHETIST, CERTIFIED REGISTERED

## 2020-10-05 PROCEDURE — 43239 EGD BIOPSY SINGLE/MULTIPLE: CPT | Performed by: SURGERY

## 2020-10-05 RX ORDER — SODIUM CHLORIDE 0.9 % (FLUSH) 0.9 %
10 SYRINGE (ML) INJECTION AS NEEDED
Status: DISCONTINUED | OUTPATIENT
Start: 2020-10-05 | End: 2020-10-05 | Stop reason: HOSPADM

## 2020-10-05 RX ORDER — PROPOFOL 10 MG/ML
INJECTION, EMULSION INTRAVENOUS AS NEEDED
Status: DISCONTINUED | OUTPATIENT
Start: 2020-10-05 | End: 2020-10-05 | Stop reason: SURG

## 2020-10-05 RX ORDER — SUCRALFATE ORAL 1 G/10ML
1 SUSPENSION ORAL 4 TIMES DAILY
Qty: 420 ML | Refills: 1 | Status: SHIPPED | OUTPATIENT
Start: 2020-10-05 | End: 2020-10-21 | Stop reason: SDUPTHER

## 2020-10-05 RX ORDER — SODIUM CHLORIDE, SODIUM LACTATE, POTASSIUM CHLORIDE, CALCIUM CHLORIDE 600; 310; 30; 20 MG/100ML; MG/100ML; MG/100ML; MG/100ML
50 INJECTION, SOLUTION INTRAVENOUS CONTINUOUS
Status: DISCONTINUED | OUTPATIENT
Start: 2020-10-05 | End: 2020-10-05 | Stop reason: HOSPADM

## 2020-10-05 RX ORDER — MAGNESIUM HYDROXIDE 1200 MG/15ML
LIQUID ORAL AS NEEDED
Status: DISCONTINUED | OUTPATIENT
Start: 2020-10-05 | End: 2020-10-05 | Stop reason: HOSPADM

## 2020-10-05 RX ORDER — LIDOCAINE HYDROCHLORIDE 20 MG/ML
INJECTION, SOLUTION INTRAVENOUS AS NEEDED
Status: DISCONTINUED | OUTPATIENT
Start: 2020-10-05 | End: 2020-10-05 | Stop reason: SURG

## 2020-10-05 RX ORDER — SODIUM CHLORIDE 0.9 % (FLUSH) 0.9 %
3 SYRINGE (ML) INJECTION EVERY 12 HOURS SCHEDULED
Status: DISCONTINUED | OUTPATIENT
Start: 2020-10-05 | End: 2020-10-05 | Stop reason: HOSPADM

## 2020-10-05 RX ADMIN — PROPOFOL 50 MG: 10 INJECTION, EMULSION INTRAVENOUS at 08:43

## 2020-10-05 RX ADMIN — PROPOFOL 50 MG: 10 INJECTION, EMULSION INTRAVENOUS at 08:40

## 2020-10-05 RX ADMIN — LIDOCAINE HYDROCHLORIDE 40 MG: 20 INJECTION, SOLUTION INTRAVENOUS at 08:37

## 2020-10-05 RX ADMIN — PROPOFOL 100 MG: 10 INJECTION, EMULSION INTRAVENOUS at 08:37

## 2020-10-05 RX ADMIN — SODIUM CHLORIDE, POTASSIUM CHLORIDE, SODIUM LACTATE AND CALCIUM CHLORIDE 50 ML/HR: 600; 310; 30; 20 INJECTION, SOLUTION INTRAVENOUS at 07:25

## 2020-10-05 NOTE — ANESTHESIA POSTPROCEDURE EVALUATION
Patient: Wagner Hayward    Procedure Summary     Date: 10/05/20 Room / Location: [facility] ENDOSCOPY 2 / [facility] ENDOSCOPY    Anesthesia Start: 0835 Anesthesia Stop: 0859    Procedure: ESOPHAGOGASTRODUODENOSCOPY WITH BIOPSY (N/A Esophagus) Diagnosis:       Left upper quadrant abdominal pain      (Left upper quadrant abdominal pain [R10.12])    Surgeon: Renae Gilbert MD Provider: Reg Zimmerman CRNA    Anesthesia Type: MAC ASA Status: 2          Anesthesia Type: MAC    Vitals  Vitals Value Taken Time   /89 10/05/20 0926   Temp 98.1 °F (36.7 °C) 10/05/20 0856   Pulse 80 10/05/20 0926   Resp 17 10/05/20 0926   SpO2 97 % 10/05/20 0926           Post Anesthesia Care and Evaluation    Patient location during evaluation: PHASE II  Patient participation: complete - patient participated  Level of consciousness: awake and sleepy but conscious  Pain management: adequate  Airway patency: patent  Anesthetic complications: No anesthetic complications  PONV Status: none  Cardiovascular status: acceptable and hemodynamically stable  Respiratory status: acceptable, room air, nonlabored ventilation and spontaneous ventilation  Hydration status: acceptable

## 2020-10-05 NOTE — ANESTHESIA PREPROCEDURE EVALUATION
Anesthesia Evaluation     Patient summary reviewed and Nursing notes reviewed   NPO Solid Status: > 8 hours  NPO Liquid Status: > 8 hours           Airway   Mallampati: II  TM distance: >3 FB  Neck ROM: full  No difficulty expected  Dental - normal exam     Pulmonary - normal exam   (+) a smoker Former, sleep apnea (multiple risk factors),     ROS comment: Former smoker 17 pack years  Cardiovascular - normal exam    Patient on routine beta blocker and Beta blocker given within 24 hours of surgery    (+) hypertension, hyperlipidemia,     ROS comment: 2019 stress test with myocardial perfusion  Technically adequate study   1) No evidence for inducible ischemia on lexiscan stress   2) Normal LV systolic function ( EF 53% ) with mild dilation of the LV   3) Global mild hypokinesis of the LV     Neuro/Psych  (+) headaches, psychiatric history Anxiety,     GI/Hepatic/Renal/Endo    (+) obesity,  GERD,  renal disease stones, diabetes mellitus using insulin,     Musculoskeletal     Abdominal  - normal exam    Bowel sounds: normal.   Substance History - negative use  (-) alcohol use, drug use     OB/GYN negative ob/gyn ROS         Other   arthritis,      ROS/Med Hx Other: BMI: 43.5    H/O heat stroke                        Anesthesia Plan    ASA 2     MAC   (Patient advised that intravenous sedation would be utilized as primary anesthetic technique. Every effort will be made to make sure patient is comfortable. Patient advised that they may experience recall of events of the procedure. Patient verbalized understanding and agreed to plan. )  intravenous induction     Anesthetic plan, all risks, benefits, and alternatives have been provided, discussed and informed consent has been obtained with: patient.    Plan discussed with CRNA.

## 2020-10-08 LAB
LAB AP CASE REPORT: NORMAL
PATH REPORT.FINAL DX SPEC: NORMAL

## 2020-10-21 ENCOUNTER — OFFICE VISIT (OUTPATIENT)
Dept: SURGERY | Facility: CLINIC | Age: 55
End: 2020-10-21

## 2020-10-21 VITALS
HEIGHT: 68 IN | WEIGHT: 290 LBS | OXYGEN SATURATION: 96 % | SYSTOLIC BLOOD PRESSURE: 130 MMHG | HEART RATE: 96 BPM | BODY MASS INDEX: 43.95 KG/M2 | DIASTOLIC BLOOD PRESSURE: 90 MMHG

## 2020-10-21 DIAGNOSIS — R10.12 LEFT UPPER QUADRANT ABDOMINAL PAIN: ICD-10-CM

## 2020-10-21 DIAGNOSIS — K29.70 GASTRITIS DETERMINED BY BIOPSY: Primary | ICD-10-CM

## 2020-10-21 PROCEDURE — 99213 OFFICE O/P EST LOW 20 MIN: CPT | Performed by: SURGERY

## 2020-10-21 RX ORDER — FAMOTIDINE 40 MG/1
40 TABLET, FILM COATED ORAL EVERY EVENING
Qty: 30 TABLET | Refills: 0 | Status: SHIPPED | OUTPATIENT
Start: 2020-10-21 | End: 2020-12-15 | Stop reason: SDUPTHER

## 2020-10-21 RX ORDER — PREDNISONE 10 MG/1
TABLET ORAL SEE ADMIN INSTRUCTIONS
COMMUNITY
Start: 2020-10-09 | End: 2020-11-10

## 2020-10-21 RX ORDER — ESOMEPRAZOLE MAGNESIUM 40 MG/1
40 CAPSULE, DELAYED RELEASE ORAL 2 TIMES DAILY
Qty: 60 CAPSULE | Refills: 1 | Status: SHIPPED | OUTPATIENT
Start: 2020-10-21 | End: 2020-12-15 | Stop reason: SDUPTHER

## 2020-10-21 RX ORDER — SERTRALINE HYDROCHLORIDE 100 MG/1
TABLET, FILM COATED ORAL NIGHTLY
COMMUNITY
Start: 2020-10-20

## 2020-10-21 RX ORDER — CYCLOBENZAPRINE HCL 10 MG
10 TABLET ORAL EVERY 8 HOURS PRN
Qty: 30 TABLET | Refills: 1 | Status: SHIPPED | OUTPATIENT
Start: 2020-10-21 | End: 2020-12-15 | Stop reason: SDUPTHER

## 2020-10-21 RX ORDER — METHOCARBAMOL 500 MG/1
500 TABLET, FILM COATED ORAL 2 TIMES DAILY
COMMUNITY
Start: 2020-10-09 | End: 2020-11-10

## 2020-10-21 RX ORDER — IBUPROFEN 800 MG/1
TABLET ORAL
COMMUNITY
Start: 2020-09-24 | End: 2020-11-10

## 2020-10-21 RX ORDER — SUCRALFATE ORAL 1 G/10ML
1 SUSPENSION ORAL 4 TIMES DAILY
Qty: 420 ML | Refills: 0 | Status: SHIPPED | OUTPATIENT
Start: 2020-10-21 | End: 2022-01-24

## 2020-10-21 RX ORDER — DICLOFENAC SODIUM 75 MG/1
TABLET, DELAYED RELEASE ORAL
COMMUNITY
Start: 2020-10-09 | End: 2022-01-25 | Stop reason: HOSPADM

## 2020-11-04 NOTE — PROGRESS NOTES
"Subjective   Walker SHEA Hayward is a 55 y.o. male.   Chief Complaint   Patient presents with   • Follow-up     shingles    • Abdominal Pain       History of Present Illness   Mr. Hayward returns the office today for follow-up regarding his left upper quadrant abdominal pain.  He has been taking Pepcid in addition to Nexium without resolution.  His symptoms are predominantly at night.  We have also tried Flexeril for treatment of any underlying musculoskeletal etiology.  He continues to complain of pain in the left flank radiating around the side to the left upper quadrant.  He states that he was told this was \"internal shingles\" but denies having a rash.    The following portions of the patient's history were reviewed and updated as appropriate: allergies, current medications, past family history, past medical history, past social history, past surgical history and problem list.    Review of Systems   Constitutional: Negative for chills, fever and unexpected weight change.   HENT: Negative for trouble swallowing and voice change.    Eyes: Negative for visual disturbance.   Respiratory: Negative for apnea, cough, chest tightness and wheezing.    Cardiovascular: Negative for chest pain, palpitations and leg swelling.   Gastrointestinal: Negative for abdominal distention, abdominal pain, anal bleeding, blood in stool, constipation, diarrhea, nausea, rectal pain and vomiting.   Endocrine: Negative for cold intolerance and heat intolerance.   Genitourinary: Negative for difficulty urinating, dysuria, flank pain and hematuria.   Musculoskeletal: Negative for back pain, gait problem and joint swelling.   Skin: Negative for color change, rash and wound.   Neurological: Negative for dizziness, syncope, speech difficulty, weakness, numbness and headaches.   Hematological: Negative for adenopathy. Does not bruise/bleed easily.   Psychiatric/Behavioral: Negative for confusion. The patient is not nervous/anxious.        Objective    BP " "120/86   Pulse 79   Resp 16   Ht 172.7 cm (68\")   Wt 134 kg (295 lb)   SpO2 98%   BMI 44.85 kg/m²     Physical Exam  Constitutional:       Appearance: He is well-developed.   HENT:      Head: Normocephalic and atraumatic.   Cardiovascular:      Rate and Rhythm: Regular rhythm.   Pulmonary:      Effort: Pulmonary effort is normal.   Abdominal:      General: There is no distension.      Palpations: Abdomen is soft.      Tenderness: There is abdominal tenderness in the left upper quadrant.   Musculoskeletal:      Comments: Pain with palpation extending from the left upper quadrant around the left lateral chest wall and left flank.  No discernible rashes.  There is also pain with palpation along the medial edge of the scapula   Skin:     General: Skin is warm and dry.   Neurological:      Mental Status: He is alert and oriented to person, place, and time.   Psychiatric:         Behavior: Behavior normal.         Assessment/Plan   Diagnoses and all orders for this visit:    1. Left flank pain, chronic (Primary)    2. Left upper quadrant abdominal pain    3. Gastroesophageal reflux disease, unspecified whether esophagitis present    Other orders  -     methylPREDNISolone (MEDROL) 4 MG dose pack; Take as directed on package instructions.  Dispense: 21 tablet; Refill: 0      At this point, I do not have any good explanation for the patient's ongoing symptoms.  Certainly, he does not have a rash typical of shingles.  Although it can be the case patients have shingles without a rash (zoster sine herpete), this is exceedingly rare.  He was treated in the past with antiviral medication and also treated for postherpetic neuralgia without any improvement in his symptoms.  I am going to give him a short course of steroids with a Medrol Dosepak.  We will see if this helps his symptoms.  In the long-term, it may be of benefit to have him see a rheumatologist.       Regarding his gastroesophageal reflux, we discussed strategies " for management in addition to his current medical therapy.  We discussed the importance of weight reduction given the patient's current body mass index of 45.  We discussed elevation of the head of his bed.  We discussed trigger foods to avoid as detailed below.  Plan to see him back in 1 month.      Reflux precautions  Trigger foods and drinks to avoid: Fatty foods, alcohol, chocolate, coffee, tea, caffeinated soft drinks (decaffeinated coffee still has some caffeine), peppermint and spearmint, spices and vinegar, citrus fruits and juices, tomatoes and tomato sauces, and smoking. Other antireflux measures include weight reduction if overweight, avoidance of tight clothing around the abdomen, elevate the head of the bed to 45 degrees (May use a bed wedge which is placed between the mattress and boxsprings or blocks under the head of the bed), don't drink or eat for 2 hours before going to bed and avoid lying down immediately after meals.

## 2020-11-10 ENCOUNTER — OFFICE VISIT (OUTPATIENT)
Dept: SURGERY | Facility: CLINIC | Age: 55
End: 2020-11-10

## 2020-11-10 VITALS
SYSTOLIC BLOOD PRESSURE: 120 MMHG | HEIGHT: 68 IN | DIASTOLIC BLOOD PRESSURE: 86 MMHG | WEIGHT: 295 LBS | RESPIRATION RATE: 16 BRPM | BODY MASS INDEX: 44.71 KG/M2 | HEART RATE: 79 BPM | OXYGEN SATURATION: 98 %

## 2020-11-10 DIAGNOSIS — G89.29 LEFT FLANK PAIN, CHRONIC: Primary | ICD-10-CM

## 2020-11-10 DIAGNOSIS — R10.12 LEFT UPPER QUADRANT ABDOMINAL PAIN: ICD-10-CM

## 2020-11-10 DIAGNOSIS — R10.9 LEFT FLANK PAIN, CHRONIC: Primary | ICD-10-CM

## 2020-11-10 DIAGNOSIS — K21.9 GASTROESOPHAGEAL REFLUX DISEASE, UNSPECIFIED WHETHER ESOPHAGITIS PRESENT: ICD-10-CM

## 2020-11-10 PROCEDURE — 99213 OFFICE O/P EST LOW 20 MIN: CPT | Performed by: SURGERY

## 2020-11-10 RX ORDER — HYDROCHLOROTHIAZIDE 25 MG/1
TABLET ORAL
COMMUNITY
Start: 2020-11-09 | End: 2022-01-24

## 2020-11-10 RX ORDER — HYDRALAZINE HYDROCHLORIDE 10 MG/1
TABLET, FILM COATED ORAL
COMMUNITY
Start: 2020-10-30 | End: 2020-11-10

## 2020-11-10 RX ORDER — ATORVASTATIN CALCIUM 40 MG/1
80 TABLET, FILM COATED ORAL NIGHTLY
COMMUNITY
Start: 2020-11-09

## 2020-11-10 RX ORDER — METHYLPREDNISOLONE 4 MG/1
TABLET ORAL
Qty: 21 TABLET | Refills: 0 | Status: SHIPPED | OUTPATIENT
Start: 2020-11-10 | End: 2020-12-15

## 2020-11-10 RX ORDER — TRIAMCINOLONE ACETONIDE 5 MG/G
CREAM TOPICAL
COMMUNITY
Start: 2020-11-09 | End: 2022-01-24

## 2020-11-10 RX ORDER — GEMFIBROZIL 600 MG/1
600 TABLET, FILM COATED ORAL 2 TIMES DAILY
COMMUNITY
Start: 2020-11-09

## 2020-11-10 RX ORDER — CHOLECALCIFEROL (VITAMIN D3) 50 MCG
2000 TABLET ORAL 2 TIMES DAILY
COMMUNITY
Start: 2020-11-09

## 2020-11-10 RX ORDER — PREGABALIN 50 MG/1
150 CAPSULE ORAL 2 TIMES DAILY
COMMUNITY
Start: 2020-11-09

## 2020-11-13 ENCOUNTER — TRANSCRIBE ORDERS (OUTPATIENT)
Dept: ADMINISTRATIVE | Facility: HOSPITAL | Age: 55
End: 2020-11-13

## 2020-11-13 DIAGNOSIS — Z87.891 FORMER SMOKER: Primary | ICD-10-CM

## 2020-11-23 ENCOUNTER — HOSPITAL ENCOUNTER (OUTPATIENT)
Dept: CT IMAGING | Facility: HOSPITAL | Age: 55
Discharge: HOME | End: 2020-11-23
Admitting: FAMILY MEDICINE

## 2020-11-23 DIAGNOSIS — Z87.891 FORMER SMOKER: ICD-10-CM

## 2020-11-23 PROCEDURE — G0297 LDCT FOR LUNG CA SCREEN: HCPCS

## 2020-12-11 NOTE — PROGRESS NOTES
"Subjective   Walker SHEA Hayward is a 55 y.o. male.   Chief Complaint   Patient presents with   • Follow-up     LUQ pain/rash     History of Present Illness     Mr. Hayward returns the office today for follow-up regarding left upper quadrant abdominal pain.  He states that his pain has improved somewhat but has not resolved.  He reports improvement with the previously prescribed muscle relaxers.  Recall that he was previously given cyclobenzaprine.  He reports that he has only been able to take these at night as they do make him drowsy.  Since he was last seen by me, he has not followed up with his PCP regarding possible specialty evaluation due to persistent concern for \"internal shingles.\"  During the multiple appointments he has had with me, I have never appreciated a vesicular rash that would suggest shingles.  It may be the case that he has postherpetic neuralgia.  I counseled him once again that the phenomenon of shingles without a rash is extremely uncommon.  To make this diagnosis, I think he would require specialty evaluation.      Regarding the other recommendations we discussed at his last visit, he has not been able to elevate the head of his bed as we previously discussed.  He states that he and his wife had been in the midst of a home remodel and had been sleeping on a mattress on the floor.  They have recently obtained a bed frame and he states that he will try bed elevation.  Notably, since his last visit, his weight has increased by 5 pounds.  He has gained 15 pounds since October.    The following portions of the patient's history were reviewed and updated as appropriate: allergies, current medications, past family history, past medical history, past social history, past surgical history and problem list.    Review of Systems   Constitutional: Negative for chills, fever and unexpected weight change.   HENT: Negative for trouble swallowing and voice change.    Eyes: Negative for visual disturbance. " "  Respiratory: Negative for apnea, cough, chest tightness and wheezing.    Cardiovascular: Negative for chest pain, palpitations and leg swelling.   Gastrointestinal: Positive for abdominal pain. Negative for abdominal distention, anal bleeding, blood in stool, constipation, diarrhea, nausea, rectal pain and vomiting.   Endocrine: Negative for cold intolerance and heat intolerance.   Genitourinary: Negative for difficulty urinating, dysuria, flank pain and hematuria.   Musculoskeletal: Negative for back pain, gait problem and joint swelling.   Skin: Negative for color change, rash and wound.   Neurological: Negative for dizziness, syncope, speech difficulty, weakness, numbness and headaches.   Hematological: Negative for adenopathy. Does not bruise/bleed easily.   Psychiatric/Behavioral: Negative for confusion. The patient is not nervous/anxious.        Objective    /80   Pulse 84   Temp 98.7 °F (37.1 °C)   Ht 172.7 cm (67.99\")   Wt 136 kg (300 lb)   SpO2 98%   BMI 45.63 kg/m²     Physical Exam  Constitutional:       Appearance: He is well-developed.   HENT:      Head: Normocephalic and atraumatic.   Neck:      Musculoskeletal: Neck supple.   Cardiovascular:      Rate and Rhythm: Regular rhythm.   Pulmonary:      Effort: Pulmonary effort is normal.   Abdominal:      General: There is no distension.      Palpations: Abdomen is soft.      Tenderness: There is abdominal tenderness in the left upper quadrant.   Skin:     General: Skin is warm and dry.   Neurological:      Mental Status: He is alert and oriented to person, place, and time.   Psychiatric:         Behavior: Behavior normal.         Assessment/Plan   Diagnoses and all orders for this visit:    1. Left upper quadrant abdominal pain (Primary)    Other orders  -     cyclobenzaprine (FLEXERIL) 10 MG tablet; Take 1 tablet by mouth At Night As Needed for Muscle Spasms.  Dispense: 30 tablet; Refill: 1  -     methocarbamol (Robaxin) 500 MG tablet; Take " 1 tablet by mouth 3 (Three) Times a Day As Needed for Muscle Spasms.  Dispense: 90 tablet; Refill: 0  -     esomeprazole (nexIUM) 40 MG capsule; Take 1 capsule by mouth 2 (Two) Times a Day.  Dispense: 60 capsule; Refill: 3  -     famotidine (PEPCID) 40 MG tablet; Take 1 tablet by mouth Every Evening.  Dispense: 30 tablet; Refill: 3      I again discussed with Mr. Hayward the importance of reflux precautions as detailed below.  I do think his left upper quadrant pain is likely multifactorial.  I suspect that it is partially related to poorly controlled gastroesophageal reflux disease and likely underlying gastritis.  I also think there is a musculoskeletal component.  His current weight and body habitus are certainly contributing to this given that his BMI is 45.6 today.  Certainly weight loss would improve the control of underlying gastroesophageal reflux, and would also likely improve any musculoskeletal pain he is having.  I have changed his regimen for reflux to twice daily Nexium and famotidine in the evening.  I have refilled his Flexeril to be used at night, and I have added Robaxin to be taken during the day in hopes that this will improve his discomfort without making him drowsy.  I advised him to follow-up with his PCP to discuss further evaluation of other causes of pain by either rheumatology or neurology given the persistent concern for shingles related source of left upper quadrant pain.  He will call me to arrange additional follow-up if the regimen detailed above does not improve his symptoms.

## 2020-12-15 ENCOUNTER — OFFICE VISIT (OUTPATIENT)
Dept: SURGERY | Facility: CLINIC | Age: 55
End: 2020-12-15

## 2020-12-15 VITALS
OXYGEN SATURATION: 98 % | DIASTOLIC BLOOD PRESSURE: 80 MMHG | WEIGHT: 300 LBS | BODY MASS INDEX: 45.47 KG/M2 | SYSTOLIC BLOOD PRESSURE: 142 MMHG | HEART RATE: 84 BPM | HEIGHT: 68 IN | TEMPERATURE: 98.7 F

## 2020-12-15 DIAGNOSIS — R10.12 LEFT UPPER QUADRANT ABDOMINAL PAIN: Primary | ICD-10-CM

## 2020-12-15 PROCEDURE — 99213 OFFICE O/P EST LOW 20 MIN: CPT | Performed by: SURGERY

## 2020-12-15 RX ORDER — ESOMEPRAZOLE MAGNESIUM 40 MG/1
40 CAPSULE, DELAYED RELEASE ORAL 2 TIMES DAILY
Qty: 60 CAPSULE | Refills: 3 | Status: SHIPPED | OUTPATIENT
Start: 2020-12-15 | End: 2022-01-24

## 2020-12-15 RX ORDER — FAMOTIDINE 40 MG/1
40 TABLET, FILM COATED ORAL EVERY EVENING
Qty: 30 TABLET | Refills: 3 | Status: SHIPPED | OUTPATIENT
Start: 2020-12-15 | End: 2022-01-24

## 2020-12-15 RX ORDER — CYCLOBENZAPRINE HCL 10 MG
10 TABLET ORAL NIGHTLY PRN
Qty: 30 TABLET | Refills: 1 | Status: SHIPPED | OUTPATIENT
Start: 2020-12-15 | End: 2021-02-15

## 2020-12-15 RX ORDER — METHOCARBAMOL 500 MG/1
500 TABLET, FILM COATED ORAL 3 TIMES DAILY PRN
Qty: 90 TABLET | Refills: 0 | Status: SHIPPED | OUTPATIENT
Start: 2020-12-15 | End: 2021-01-29

## 2021-01-29 RX ORDER — METHOCARBAMOL 500 MG/1
TABLET, FILM COATED ORAL
Qty: 90 TABLET | Refills: 0 | Status: SHIPPED | OUTPATIENT
Start: 2021-01-29 | End: 2021-03-01

## 2021-01-29 RX ORDER — PANTOPRAZOLE SODIUM 40 MG/1
40 TABLET, DELAYED RELEASE ORAL DAILY
Qty: 30 TABLET | Refills: 0 | Status: SHIPPED | OUTPATIENT
Start: 2021-01-29 | End: 2021-03-01

## 2021-02-01 ENCOUNTER — TRANSCRIBE ORDERS (OUTPATIENT)
Dept: ADMINISTRATIVE | Facility: HOSPITAL | Age: 56
End: 2021-02-01

## 2021-02-01 DIAGNOSIS — R91.1 LUNG NODULE: Primary | ICD-10-CM

## 2021-02-15 RX ORDER — CYCLOBENZAPRINE HCL 10 MG
10 TABLET ORAL NIGHTLY PRN
Qty: 30 TABLET | Refills: 1 | Status: SHIPPED | OUTPATIENT
Start: 2021-02-15 | End: 2022-01-25 | Stop reason: HOSPADM

## 2021-02-24 ENCOUNTER — HOSPITAL ENCOUNTER (OUTPATIENT)
Dept: CT IMAGING | Facility: HOSPITAL | Age: 56
Discharge: HOME | End: 2021-02-24
Admitting: FAMILY MEDICINE

## 2021-02-24 DIAGNOSIS — R91.1 LUNG NODULE: ICD-10-CM

## 2021-02-24 LAB — CREAT BLDA-MCNC: 1.5 MG/DL (ref 0.6–1.3)

## 2021-02-24 PROCEDURE — 82565 ASSAY OF CREATININE: CPT

## 2021-02-24 PROCEDURE — 25010000002 IOPAMIDOL 61 % SOLUTION: Performed by: FAMILY MEDICINE

## 2021-02-24 PROCEDURE — 71260 CT THORAX DX C+: CPT

## 2021-02-24 RX ADMIN — IOPAMIDOL 100 ML: 612 INJECTION, SOLUTION INTRAVENOUS at 09:52

## 2021-03-01 RX ORDER — METHOCARBAMOL 500 MG/1
TABLET, FILM COATED ORAL
Qty: 90 TABLET | Refills: 0 | Status: SHIPPED | OUTPATIENT
Start: 2021-03-01 | End: 2021-03-30

## 2021-03-01 RX ORDER — PANTOPRAZOLE SODIUM 40 MG/1
40 TABLET, DELAYED RELEASE ORAL DAILY
Qty: 30 TABLET | Refills: 0 | Status: SHIPPED | OUTPATIENT
Start: 2021-03-01 | End: 2022-01-24

## 2021-03-30 RX ORDER — METHOCARBAMOL 500 MG/1
TABLET, FILM COATED ORAL
Qty: 90 TABLET | Refills: 0 | Status: SHIPPED | OUTPATIENT
Start: 2021-03-30 | End: 2021-04-28

## 2021-04-28 RX ORDER — METHOCARBAMOL 500 MG/1
TABLET, FILM COATED ORAL
Qty: 90 TABLET | Refills: 0 | Status: SHIPPED | OUTPATIENT
Start: 2021-04-28 | End: 2021-05-28

## 2021-05-17 ENCOUNTER — TRANSCRIBE ORDERS (OUTPATIENT)
Dept: ADMINISTRATIVE | Facility: HOSPITAL | Age: 56
End: 2021-05-17

## 2021-05-17 DIAGNOSIS — R91.1 LUNG NODULE: Primary | ICD-10-CM

## 2021-05-26 ENCOUNTER — APPOINTMENT (OUTPATIENT)
Dept: CT IMAGING | Facility: HOSPITAL | Age: 56
End: 2021-05-26

## 2021-05-28 RX ORDER — METHOCARBAMOL 500 MG/1
TABLET, FILM COATED ORAL
Qty: 90 TABLET | Refills: 0 | Status: SHIPPED | OUTPATIENT
Start: 2021-05-28 | End: 2022-01-24

## 2021-06-28 RX ORDER — METHOCARBAMOL 500 MG/1
TABLET, FILM COATED ORAL
Qty: 90 TABLET | Refills: 0 | OUTPATIENT
Start: 2021-06-28

## 2021-09-07 ENCOUNTER — LAB (OUTPATIENT)
Dept: LAB | Facility: HOSPITAL | Age: 56
End: 2021-09-07

## 2021-09-07 ENCOUNTER — TRANSCRIBE ORDERS (OUTPATIENT)
Dept: LAB | Facility: HOSPITAL | Age: 56
End: 2021-09-07

## 2021-09-07 DIAGNOSIS — Z20.822 COVID-19 RULED OUT: ICD-10-CM

## 2021-09-07 DIAGNOSIS — Z20.822 COVID-19 RULED OUT: Primary | ICD-10-CM

## 2021-09-07 PROCEDURE — U0004 COV-19 TEST NON-CDC HGH THRU: HCPCS

## 2021-09-08 LAB — SARS-COV-2 RNA NOSE QL NAA+PROBE: NOT DETECTED

## 2021-09-09 ENCOUNTER — TELEPHONE (OUTPATIENT)
Dept: PREADMISSION TESTING | Facility: HOSPITAL | Age: 56
End: 2021-09-09

## 2021-11-11 ENCOUNTER — TRANSCRIBE ORDERS (OUTPATIENT)
Dept: ADMINISTRATIVE | Facility: HOSPITAL | Age: 56
End: 2021-11-11

## 2021-11-11 DIAGNOSIS — R91.1 LUNG NODULE: Primary | ICD-10-CM

## 2021-11-22 ENCOUNTER — HOSPITAL ENCOUNTER (OUTPATIENT)
Dept: CT IMAGING | Facility: HOSPITAL | Age: 56
Discharge: HOME | End: 2021-11-22
Admitting: FAMILY MEDICINE

## 2021-11-22 DIAGNOSIS — R91.1 LUNG NODULE: ICD-10-CM

## 2021-11-22 PROCEDURE — 71270 CT THORAX DX C-/C+: CPT

## 2021-11-22 PROCEDURE — 25010000002 IOPAMIDOL 61 % SOLUTION: Performed by: FAMILY MEDICINE

## 2021-11-22 RX ADMIN — IOPAMIDOL 100 ML: 612 INJECTION, SOLUTION INTRAVENOUS at 16:44

## 2021-11-24 RX ORDER — METHYLPREDNISOLONE 4 MG/1
TABLET ORAL
Qty: 21 EACH | OUTPATIENT
Start: 2021-11-24

## 2021-11-29 ENCOUNTER — TELEPHONE (OUTPATIENT)
Dept: SURGERY | Facility: CLINIC | Age: 56
End: 2021-11-29

## 2021-11-29 NOTE — TELEPHONE ENCOUNTER
PRESCREENING FOR OPEN ACCESS SCHEDULING    Wagner Hayward, 1965  [id number]    11/29/21    If, the patient answers yes to any of the following questions the provider will be informed prior to scheduling open access for approval and documented in the chart.    [x]  Yes  [] No    1. Have you ever had a colonoscopy in the past?      When:  3 years      Where: [facility]      Polyps or other:   Yes polyps    [x]  Yes  [] No    2. Family history of colon cancer?      Relation: father      Age of onset: 75       Do you currently have any of the following?    []  Yes  [x] No  Rectal bleeding, if so, how long?     []  Yes  [x] No  Abdominal pain, if so, how long?    []  Yes  [x] No  Constipation, if so, how long?    []  Yes  [x] No  Diarrhea, if so, how long?    []  Yes  [x] No  Weight loss, is so, how much?    [] Yes  [x] No  Small caliber stool, if so, how long?      Have you ever had any of the following conditions?    [] Yes  [x] No  Heart attack?      When?       Last cardiac workup?     Blood thinners?    [] Yes  [x] No   Lung problems, asthma or COPD?  [] Yes  [x] No  Oxygen required?       [] Yes  [x] No  Stroke?     [] Yes  [x] No  Have you ever had a reaction to anesthesia?             
abdominal pain

## 2021-11-30 ENCOUNTER — PREP FOR SURGERY (OUTPATIENT)
Dept: OTHER | Facility: HOSPITAL | Age: 56
End: 2021-11-30

## 2021-11-30 DIAGNOSIS — Z86.010 HISTORY OF COLON POLYPS: Primary | ICD-10-CM

## 2021-11-30 PROBLEM — Z86.0100 HISTORY OF COLON POLYPS: Status: ACTIVE | Noted: 2021-11-30

## 2021-11-30 RX ORDER — POLYETHYLENE GLYCOL 3350 17 G/17G
238 POWDER, FOR SOLUTION ORAL ONCE
Qty: 17 PACKET | Refills: 0 | Status: SHIPPED | OUTPATIENT
Start: 2021-11-30 | End: 2021-11-30

## 2021-11-30 RX ORDER — BISACODYL 5 MG
TABLET, DELAYED RELEASE (ENTERIC COATED) ORAL
Qty: 4 TABLET | Refills: 0 | Status: SHIPPED | OUTPATIENT
Start: 2021-11-30 | End: 2022-01-25 | Stop reason: HOSPADM

## 2022-01-20 ENCOUNTER — TELEPHONE (OUTPATIENT)
Dept: SURGERY | Facility: CLINIC | Age: 57
End: 2022-01-20

## 2022-01-20 NOTE — TELEPHONE ENCOUNTER
SPOKE WITH LESLIE TO CONFIRM FOR COLONOSCOPY 01/25/2022 @ [facility]

## 2022-01-24 RX ORDER — MELOXICAM 15 MG/1
15 TABLET ORAL DAILY
COMMUNITY

## 2022-01-24 RX ORDER — EMPAGLIFLOZIN 10 MG/1
10 TABLET, FILM COATED ORAL DAILY
COMMUNITY

## 2022-01-25 ENCOUNTER — ANESTHESIA EVENT (OUTPATIENT)
Dept: GASTROENTEROLOGY | Facility: HOSPITAL | Age: 57
End: 2022-01-25

## 2022-01-25 ENCOUNTER — ANESTHESIA (OUTPATIENT)
Dept: GASTROENTEROLOGY | Facility: HOSPITAL | Age: 57
End: 2022-01-25

## 2022-01-25 ENCOUNTER — HOSPITAL ENCOUNTER (OUTPATIENT)
Facility: HOSPITAL | Age: 57
Setting detail: HOSPITAL OUTPATIENT SURGERY
Discharge: HOME | End: 2022-01-25
Attending: SURGERY | Admitting: SURGERY

## 2022-01-25 VITALS
OXYGEN SATURATION: 97 % | SYSTOLIC BLOOD PRESSURE: 121 MMHG | WEIGHT: 299 LBS | TEMPERATURE: 97.4 F | DIASTOLIC BLOOD PRESSURE: 76 MMHG | HEART RATE: 80 BPM | BODY MASS INDEX: 44.28 KG/M2 | HEIGHT: 69 IN | RESPIRATION RATE: 16 BRPM

## 2022-01-25 DIAGNOSIS — Z86.010 HISTORY OF COLON POLYPS: ICD-10-CM

## 2022-01-25 LAB — GLUCOSE BLDC GLUCOMTR-MCNC: 186 MG/DL (ref 70–130)

## 2022-01-25 PROCEDURE — 82962 GLUCOSE BLOOD TEST: CPT

## 2022-01-25 PROCEDURE — 25010000002 PROPOFOL 200 MG/20ML EMULSION: Performed by: NURSE ANESTHETIST, CERTIFIED REGISTERED

## 2022-01-25 PROCEDURE — 45385 COLONOSCOPY W/LESION REMOVAL: CPT | Performed by: SURGERY

## 2022-01-25 PROCEDURE — 45380 COLONOSCOPY AND BIOPSY: CPT | Performed by: SURGERY

## 2022-01-25 PROCEDURE — S0260 H&P FOR SURGERY: HCPCS | Performed by: SURGERY

## 2022-01-25 RX ORDER — SODIUM CHLORIDE, SODIUM LACTATE, POTASSIUM CHLORIDE, CALCIUM CHLORIDE 600; 310; 30; 20 MG/100ML; MG/100ML; MG/100ML; MG/100ML
1000 INJECTION, SOLUTION INTRAVENOUS CONTINUOUS
Status: DISCONTINUED | OUTPATIENT
Start: 2022-01-25 | End: 2022-01-25 | Stop reason: HOSPADM

## 2022-01-25 RX ORDER — LIDOCAINE HYDROCHLORIDE 20 MG/ML
INJECTION, SOLUTION INTRAVENOUS AS NEEDED
Status: DISCONTINUED | OUTPATIENT
Start: 2022-01-25 | End: 2022-01-25 | Stop reason: SURG

## 2022-01-25 RX ORDER — PROPOFOL 10 MG/ML
INJECTION, EMULSION INTRAVENOUS AS NEEDED
Status: DISCONTINUED | OUTPATIENT
Start: 2022-01-25 | End: 2022-01-25 | Stop reason: SURG

## 2022-01-25 RX ORDER — ONDANSETRON 2 MG/ML
4 INJECTION INTRAMUSCULAR; INTRAVENOUS ONCE AS NEEDED
Status: DISCONTINUED | OUTPATIENT
Start: 2022-01-25 | End: 2022-01-25 | Stop reason: HOSPADM

## 2022-01-25 RX ADMIN — PROPOFOL 200 MG: 10 INJECTION, EMULSION INTRAVENOUS at 12:32

## 2022-01-25 RX ADMIN — PROPOFOL 200 MG: 10 INJECTION, EMULSION INTRAVENOUS at 12:44

## 2022-01-25 RX ADMIN — LIDOCAINE HYDROCHLORIDE 60 MG: 20 INJECTION, SOLUTION INTRAVENOUS at 12:32

## 2022-01-25 RX ADMIN — SODIUM CHLORIDE, POTASSIUM CHLORIDE, SODIUM LACTATE AND CALCIUM CHLORIDE 1000 ML: 600; 310; 30; 20 INJECTION, SOLUTION INTRAVENOUS at 10:31

## 2022-01-25 NOTE — ANESTHESIA POSTPROCEDURE EVALUATION
Patient: Wagner Hayward    Procedure Summary     Date: 01/25/22 Room / Location: [facility] ENDOSCOPY 2 / [facility] ENDOSCOPY    Anesthesia Start: 1224 Anesthesia Stop: 1249    Procedure: COLONOSCOPY with biopsy and polypectomy (N/A Anus) Diagnosis:       History of colon polyps      (History of colon polyps [Z86.010])    Surgeons: Elian Reynoso MD Provider: Russell Laurent CRNA    Anesthesia Type: MAC ASA Status: 2          Anesthesia Type: MAC    Vitals  Vitals Value Taken Time   /76 01/25/22 1323   Temp 97.4 °F (36.3 °C) 01/25/22 1253   Pulse 80 01/25/22 1323   Resp 16 01/25/22 1323   SpO2 97 % 01/25/22 1323           Post Anesthesia Care and Evaluation    Patient location during evaluation: bedside  Patient participation: complete - patient participated  Level of consciousness: awake  Pain score: 0  Pain management: adequate  Airway patency: patent  Anesthetic complications: No anesthetic complications  PONV Status: controlled  Cardiovascular status: acceptable and stable  Respiratory status: acceptable and room air  Hydration status: acceptable

## 2022-01-25 NOTE — ANESTHESIA PREPROCEDURE EVALUATION
Anesthesia Evaluation     Patient summary reviewed and Nursing notes reviewed   NPO Solid Status: > 8 hours  NPO Liquid Status: > 8 hours           Airway   Mallampati: II  TM distance: >3 FB  Neck ROM: full  No difficulty expected  Dental - normal exam     Pulmonary - normal exam   (+) a smoker Former, COPD, shortness of breath, sleep apnea (multiple risk factors),     ROS comment: Former smoker 17 pack years  Cardiovascular - normal exam    Patient on routine beta blocker and Beta blocker given within 24 hours of surgery    (+) hypertension, MORALES, PVD, hyperlipidemia,   CAD:  inc risk obese, kandi, dm.    ROS comment: 2019 stress test with myocardial perfusion  Technically adequate study   1) No evidence for inducible ischemia on lexiscan stress   2) Normal LV systolic function ( EF 53% ) with mild dilation of the LV   3) Global mild hypokinesis of the LV     Neuro/Psych  (+) headaches, psychiatric history Anxiety,     GI/Hepatic/Renal/Endo    (+) obesity,  GERD,  renal disease stones, diabetes mellitus type 2 using insulin,     Musculoskeletal     (+) arthralgias, myalgias,   Abdominal  - normal exam    Bowel sounds: normal.   Substance History - negative use  (-) alcohol use, drug use     OB/GYN negative ob/gyn ROS         Other   arthritis,      ROS/Med Hx Other: BMI: 43.5    H/O heat stroke                        Anesthesia Plan    ASA 2     MAC   (Patient advised that intravenous sedation would be utilized as primary anesthetic technique. Every effort will be made to make sure patient is comfortable. Patient advised that they may experience recall of events of the procedure. Patient verbalized understanding and agreed to plan. )  intravenous induction     Anesthetic plan, all risks, benefits, and alternatives have been provided, discussed and informed consent has been obtained with: patient.

## 2022-01-28 LAB
LAB AP CASE REPORT: NORMAL
PATH REPORT.FINAL DX SPEC: NORMAL

## 2022-09-12 ENCOUNTER — TRANSCRIBE ORDERS (OUTPATIENT)
Dept: ADMINISTRATIVE | Facility: HOSPITAL | Age: 57
End: 2022-09-12

## 2022-09-12 DIAGNOSIS — G89.29 CHRONIC LEFT SHOULDER PAIN: Primary | ICD-10-CM

## 2022-09-12 DIAGNOSIS — M25.512 CHRONIC LEFT SHOULDER PAIN: Primary | ICD-10-CM

## 2022-10-14 ENCOUNTER — APPOINTMENT (OUTPATIENT)
Dept: MRI IMAGING | Facility: HOSPITAL | Age: 57
End: 2022-10-14

## 2022-10-21 ENCOUNTER — HOSPITAL ENCOUNTER (OUTPATIENT)
Dept: MRI IMAGING | Facility: HOSPITAL | Age: 57
Discharge: HOME | End: 2022-10-21
Admitting: FAMILY MEDICINE

## 2022-10-21 DIAGNOSIS — G89.29 CHRONIC LEFT SHOULDER PAIN: ICD-10-CM

## 2022-10-21 DIAGNOSIS — M25.512 CHRONIC LEFT SHOULDER PAIN: ICD-10-CM

## 2022-10-21 PROCEDURE — 73221 MRI JOINT UPR EXTREM W/O DYE: CPT

## 2023-03-27 ENCOUNTER — TRANSCRIBE ORDERS (OUTPATIENT)
Dept: ADMINISTRATIVE | Facility: HOSPITAL | Age: 58
End: 2023-03-27
Payer: COMMERCIAL

## 2023-03-27 DIAGNOSIS — R91.1 LUNG NODULE: Primary | ICD-10-CM

## 2023-04-17 ENCOUNTER — HOSPITAL ENCOUNTER (OUTPATIENT)
Dept: CT IMAGING | Facility: HOSPITAL | Age: 58
Discharge: HOME | End: 2023-04-17
Admitting: FAMILY MEDICINE
Payer: COMMERCIAL

## 2023-04-17 DIAGNOSIS — R91.1 LUNG NODULE: ICD-10-CM

## 2023-04-17 PROCEDURE — 71250 CT THORAX DX C-: CPT
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
## 2024-10-09 ENCOUNTER — TELEPHONE (OUTPATIENT)
Dept: CARDIOLOGY | Facility: CLINIC | Age: 59
End: 2024-10-09

## 2024-10-09 ENCOUNTER — OFFICE VISIT (OUTPATIENT)
Dept: CARDIOLOGY | Facility: CLINIC | Age: 59
End: 2024-10-09
Payer: COMMERCIAL

## 2024-10-09 VITALS
OXYGEN SATURATION: 99 % | WEIGHT: 296 LBS | BODY MASS INDEX: 43.84 KG/M2 | HEIGHT: 69 IN | SYSTOLIC BLOOD PRESSURE: 144 MMHG | HEART RATE: 60 BPM | DIASTOLIC BLOOD PRESSURE: 80 MMHG

## 2024-10-09 DIAGNOSIS — I10 HTN (HYPERTENSION), BENIGN: ICD-10-CM

## 2024-10-09 DIAGNOSIS — R06.09 DOE (DYSPNEA ON EXERTION): ICD-10-CM

## 2024-10-09 DIAGNOSIS — R00.2 PALPITATIONS: ICD-10-CM

## 2024-10-09 DIAGNOSIS — E78.2 MIXED HYPERLIPIDEMIA: ICD-10-CM

## 2024-10-09 DIAGNOSIS — R07.2 PRECORDIAL PAIN: Primary | ICD-10-CM

## 2024-10-09 PROCEDURE — 99204 OFFICE O/P NEW MOD 45 MIN: CPT | Performed by: INTERNAL MEDICINE

## 2024-10-09 PROCEDURE — 93000 ELECTROCARDIOGRAM COMPLETE: CPT | Performed by: INTERNAL MEDICINE

## 2024-10-09 RX ORDER — LISINOPRIL 10 MG/1
5 TABLET ORAL DAILY
COMMUNITY

## 2024-10-09 RX ORDER — CHLORTHALIDONE 25 MG/1
25 TABLET ORAL DAILY
Qty: 30 TABLET | Refills: 5 | Status: SHIPPED | OUTPATIENT
Start: 2024-10-09

## 2024-10-09 RX ORDER — ASPIRIN 81 MG/1
81 TABLET ORAL DAILY
COMMUNITY

## 2024-10-09 NOTE — PATIENT INSTRUCTIONS
STOP hydralazine    START chlorthalidone 25mg daily    Ordering stress, echo, and monitor.     HOLD carvedilol for about 2 days before stress test    Blood work in 2 weeks

## 2024-12-09 ENCOUNTER — OFFICE VISIT (OUTPATIENT)
Dept: CARDIOLOGY | Facility: CLINIC | Age: 59
End: 2024-12-09
Payer: COMMERCIAL

## 2024-12-09 VITALS
WEIGHT: 314 LBS | HEART RATE: 69 BPM | HEIGHT: 69 IN | DIASTOLIC BLOOD PRESSURE: 72 MMHG | OXYGEN SATURATION: 94 % | BODY MASS INDEX: 46.51 KG/M2 | SYSTOLIC BLOOD PRESSURE: 132 MMHG

## 2024-12-09 DIAGNOSIS — R07.2 PRECORDIAL PAIN: Primary | ICD-10-CM

## 2024-12-09 DIAGNOSIS — R00.2 PALPITATIONS: ICD-10-CM

## 2024-12-09 DIAGNOSIS — I10 PRIMARY HYPERTENSION: Chronic | ICD-10-CM

## 2024-12-09 DIAGNOSIS — G47.33 OSA (OBSTRUCTIVE SLEEP APNEA): Chronic | ICD-10-CM

## 2024-12-09 PROCEDURE — 99214 OFFICE O/P EST MOD 30 MIN: CPT | Performed by: INTERNAL MEDICINE

## 2024-12-09 RX ORDER — ISOSORBIDE MONONITRATE 30 MG/1
30 TABLET, EXTENDED RELEASE ORAL DAILY
Qty: 30 TABLET | Refills: 11 | Status: SHIPPED | OUTPATIENT
Start: 2024-12-09

## 2024-12-09 NOTE — PROGRESS NOTES
[facility] Cardiology Office Follow Up Note    Wagner Hayward  [id number]  2024    Primary Care Provider: Gina Rucker DO    Chief Complaint   Patient presents with    Follow-up    Precordial pain       Subjective     History of Present Illness:   Wagner Hayward is a 59 y.o. male with diabetes, hypertension, hyperlipidemia, and former smoker  who presents to the Cardiology Clinic for follow up of palpitations and chest tightness.  During his last visit, we ordered a stress test, echo, and Holter to evaluate symptoms of chest pain, dyspnea, and palpitations.  Echo was relatively unremarkable.  Heart monitor showed some PACs, ventricular bigeminy and no arrhythmias.  We did switch his hydralazine to chlorthalidone but this was reportedly changed back to hydralazine by his primary care because repeat blood work showed worsening kidney function.  Patient's lisinopril was apparently increased to 30 twice daily.  Blood pressure appears to be doing well.  He unfortunately continues to have the same chest tightness whenever he gets up to do anything.  Accompanied by exertional dyspnea.  No apparent change with better blood pressure control.    Past Cardiac Testin. Last Coronary Angio: none    2. Last Echo: 24    Left ventricular systolic function is normal. Left ventricular ejection fraction appears to be 61 - 65%. Left ventricular diastolic function was normal.    The right ventricular cavity is borderline dilated with normal systolic function.    No hemodynamically significant valvular dysfunction.     3. Prior Stress Testing: No showed    4. Prior Holter Monitor: 10/9/24  Overall, low risk cardiac monitor.  There were 11 patient triggered/symptomatic events which correlated with normal sinus rhythm.  Few short runs of PACs were auto detected.  No symptoms to correlate.  No malignant arrhythmias or blocks detected.    [place] of ectopy low.  Ventricular bigeminy was auto  detected.  Normal heart rate variability.       Review of Systems:  Review of Systems   Constitutional: Negative.    Respiratory:  Positive for chest tightness and shortness of breath. Negative for cough.    Cardiovascular:  Positive for chest pain. Negative for palpitations and leg swelling.   Gastrointestinal: Negative.    Musculoskeletal: Negative.    Neurological: Negative.        I have reviewed and/or updated the patient's past medical, past surgical, family, social history, problem list and allergies as appropriate.       Current Outpatient Medications:     Acetaminophen (TYLENOL ARTHRITIS PAIN PO), Take 2 tablets by mouth 2 (Two) Times a Day., Disp: , Rfl:     aspirin 81 MG EC tablet, Take 1 tablet by mouth Daily., Disp: , Rfl:     atorvastatin (LIPITOR) 40 MG tablet, Take 2 tablets by mouth Every Night., Disp: , Rfl:     carvedilol (COREG) 25 MG tablet, Take 1 tablet by mouth Daily Before Lunch., Disp: , Rfl:     Cholecalciferol (Vitamin D) 50 MCG (2000 UT) tablet, Take 1 tablet by mouth 2 (Two) Times a Day., Disp: , Rfl:     empagliflozin (Jardiance) 10 MG tablet tablet, Take 1 tablet by mouth Daily., Disp: , Rfl:     gemfibrozil (LOPID) 600 MG tablet, Take 1 tablet by mouth 2 (Two) Times a Day., Disp: , Rfl:     HUMALOG MIX 75/25 KWIKPEN (75-25) 100 UNIT/ML suspension pen-injector pen, Inject 30 Units under the skin into the appropriate area as directed 2 (Two) Times a Day With Meals. 30 in the morning/60 units HS, Disp: , Rfl:     HYDRALAZINE HCL PO, Take  by mouth 3 (Three) Times a Day., Disp: , Rfl:     lisinopril (PRINIVIL,ZESTRIL) 10 MG tablet, Take 0.5 tablets by mouth Daily. Pt takes 1-2 tabs daily if bp is elevated (Patient taking differently: Take 3 tablets by mouth 2 (Two) Times a Day. Pt takes 1-2 tabs daily if bp is elevated), Disp: , Rfl:     MELATONIN PO, Take  by mouth Every Night., Disp: , Rfl:     meloxicam (MOBIC) 15 MG tablet, Take 1 tablet by mouth Daily., Disp: , Rfl:     ONE TOUCH  "ULTRA TEST test strip, TEST DAILY AS NEEDED (Patient taking differently: 1 each by Other route As Needed (FBS).), Disp: 100 each, Rfl: 3    pregabalin (LYRICA) 50 MG capsule, Take 3 capsules by mouth 2 (Two) Times a Day., Disp: , Rfl:     sertraline (ZOLOFT) 100 MG tablet, Take  by mouth Every Night., Disp: , Rfl:     isosorbide mononitrate (IMDUR) 30 MG 24 hr tablet, Take 1 tablet by mouth Daily., Disp: 30 tablet, Rfl: 11       Objective     Vitals:  Vitals:    12/09/24 1011   BP: 132/72   BP Location: Right arm   Patient Position: Sitting   Pulse: 69   SpO2: 94%   Weight: (!) 142 kg (314 lb)   Height: 175.3 cm (69.02\")       Physical Exam:  Vitals reviewed.   Constitutional:       Appearance: Healthy appearance. Not in distress.   Pulmonary:      Effort: Pulmonary effort is normal.      Breath sounds: Normal breath sounds.   Cardiovascular:      Normal rate. Regular rhythm. Normal S1. Normal S2.       Murmurs: There is no murmur.      No gallop.  No click. No rub.   Pulses:     Intact distal pulses.   Edema:     Peripheral edema absent.   Skin:     General: Skin is warm and dry.         Results Review:   I reviewed the patient's new clinical results.  I reviewed the patient's new imaging results and agree with the interpretation.  I reviewed the patient's other test results and agree with the interpretation    Procedures        Assessment & Plan   Diagnoses and all orders for this visit:    1. Precordial pain (Primary)  Symptoms are less constant and are now more associated with exertion.  Not classically anginal however.  There could be a significant GI/musculoskeletal component.  Dyspnea is most reproducible with exertion.  Echocardiogram showed normal LV function with borderline RV dilation.  Monitor showed no arrhythmias.  Considering his age and risk factors (former smoker, obesity, diabetes, attention, and hyperlipidemia), I do think further evaluation is necessary.  -- Will plan to reschedule his stress " test  -- Will go ahead and add Imdur to his regimen as antianginal.  This will also serve to optimize his blood pressure control  -- Continue aspirin and statin    2. ORLY (obstructive sleep apnea)  Apparently carries this diagnosis but could not tolerate CPAP in the past.  Echo shows borderline RV dilation.  No signs of heart failure.  -- Strongly encouraged him to get in touch with his sleep clinic and obtain his CPAP to treat ORLY    3. Primary hypertension  Borderline today.  Goal less than 130/80.  Apparently chlorthalidone because creatinine bump.  -- Adding Imdur per above.  -- Strongly recommend that he discuss taking no more than 40 mg of lisinopril daily, not 30 twice daily, with his primary care physician.  -- Continue hydralazine and carvedilol    4. Palpitations  Previously complained of palpitations which correlated with normal rhythms on Holter.  He had few short runs of PACs and some ventricular bigeminy but no malignant arrhythmias.  --Continue beta-blocker              Plan   No orders of the defined types were placed in this encounter.    Medications:  -     isosorbide mononitrate (IMDUR) 30 MG 24 hr tablet; Take 1 tablet by mouth Daily.  Dispense: 30 tablet; Refill: 11    Preventative Cardiology:   Tobacco Cessation: N/A  Obstructive Sleep Apnea Screening: Completed  AAA Screening: Deffered  Peripheral Arterial Disease Screening: Deffered       Follow Up:   Return in about 6 weeks (around 1/20/2025).    Thank you for allowing me to participate in the care of your patient. Please to not hesitate to contact me with additional questions or concerns.     Rey Ng MD  12/09/2024  08:05 EST

## 2025-02-05 ENCOUNTER — HOSPITAL ENCOUNTER (OUTPATIENT)
Dept: NUCLEAR MEDICINE | Facility: HOSPITAL | Age: 60
Discharge: HOME | End: 2025-02-05
Payer: COMMERCIAL

## 2025-02-05 DIAGNOSIS — R07.2 PRECORDIAL PAIN: ICD-10-CM

## 2025-02-05 PROCEDURE — A9500 TC99M SESTAMIBI: HCPCS | Performed by: INTERNAL MEDICINE

## 2025-02-05 PROCEDURE — 34310000005 TECHNETIUM SESTAMIBI: Performed by: INTERNAL MEDICINE

## 2025-02-05 PROCEDURE — 78452 HT MUSCLE IMAGE SPECT MULT: CPT

## 2025-02-05 PROCEDURE — 25010000002 REGADENOSON 0.4 MG/5ML SOLUTION: Performed by: INTERNAL MEDICINE

## 2025-02-05 PROCEDURE — 93017 CV STRESS TEST TRACING ONLY: CPT

## 2025-02-05 RX ORDER — REGADENOSON 0.08 MG/ML
0.4 INJECTION, SOLUTION INTRAVENOUS
Status: COMPLETED | OUTPATIENT
Start: 2025-02-05 | End: 2025-02-05

## 2025-02-05 RX ADMIN — TECHNETIUM TC 99M SESTAMIBI 1 DOSE: 1 INJECTION INTRAVENOUS at 09:15

## 2025-02-05 RX ADMIN — REGADENOSON 0.4 MG: 0.08 INJECTION, SOLUTION INTRAVENOUS at 09:15

## 2025-02-06 ENCOUNTER — HOSPITAL ENCOUNTER (OUTPATIENT)
Dept: NUCLEAR MEDICINE | Facility: HOSPITAL | Age: 60
Discharge: HOME | End: 2025-02-06
Payer: COMMERCIAL

## 2025-02-06 PROCEDURE — A9500 TC99M SESTAMIBI: HCPCS | Performed by: INTERNAL MEDICINE

## 2025-02-06 PROCEDURE — 34310000005 TECHNETIUM SESTAMIBI: Performed by: INTERNAL MEDICINE

## 2025-02-06 RX ADMIN — TECHNETIUM TC 99M SESTAMIBI 1 DOSE: 1 INJECTION INTRAVENOUS at 06:50

## 2025-02-07 LAB
BH CV REST NUCLEAR ISOTOPE DOSE: 33.5 MCI
BH CV STRESS COMMENTS STAGE 1: NORMAL
BH CV STRESS DOSE REGADENOSON STAGE 1: 0.4
BH CV STRESS DURATION MIN STAGE 1: 0
BH CV STRESS DURATION SEC STAGE 1: 10
BH CV STRESS NUCLEAR ISOTOPE DOSE: 32.3 MCI
BH CV STRESS PROTOCOL 1: NORMAL
BH CV STRESS RECOVERY BP: NORMAL MMHG
BH CV STRESS RECOVERY HR: 81 BPM
BH CV STRESS STAGE 1: 1
MAXIMAL PREDICTED HEART RATE: 159 BPM
PERCENT MAX PREDICTED HR: 52.2 %
SPECT HRT GATED+EF W RNC IV: 51 %
STRESS BASELINE BP: NORMAL MMHG
STRESS BASELINE HR: 68 BPM
STRESS PERCENT HR: 61 %
STRESS POST PEAK BP: NORMAL MMHG
STRESS POST PEAK HR: 83 BPM
STRESS TARGET HR: 135 BPM

## 2025-02-12 ENCOUNTER — OFFICE VISIT (OUTPATIENT)
Dept: CARDIOLOGY | Facility: CLINIC | Age: 60
End: 2025-02-12
Payer: COMMERCIAL

## 2025-02-12 VITALS
OXYGEN SATURATION: 93 % | HEART RATE: 78 BPM | DIASTOLIC BLOOD PRESSURE: 76 MMHG | WEIGHT: 315 LBS | RESPIRATION RATE: 19 BRPM | BODY MASS INDEX: 46.65 KG/M2 | SYSTOLIC BLOOD PRESSURE: 124 MMHG | HEIGHT: 69 IN

## 2025-02-12 DIAGNOSIS — I10 PRIMARY HYPERTENSION: Chronic | ICD-10-CM

## 2025-02-12 DIAGNOSIS — R07.89 CHEST PAIN, ATYPICAL: Primary | ICD-10-CM

## 2025-02-12 DIAGNOSIS — R00.2 PALPITATIONS: ICD-10-CM

## 2025-02-12 DIAGNOSIS — E66.01 MORBID OBESITY WITH BMI OF 40.0-44.9, ADULT: ICD-10-CM

## 2025-02-12 DIAGNOSIS — G47.33 OSA (OBSTRUCTIVE SLEEP APNEA): Chronic | ICD-10-CM

## 2025-02-12 PROCEDURE — 99214 OFFICE O/P EST MOD 30 MIN: CPT | Performed by: NURSE PRACTITIONER

## 2025-02-12 RX ORDER — INSULIN LISPRO 100 [IU]/ML
INJECTION, SOLUTION INTRAVENOUS; SUBCUTANEOUS
COMMUNITY
Start: 2025-02-07

## 2025-02-12 RX ORDER — ICOSAPENT ETHYL 1 G/1
CAPSULE ORAL
COMMUNITY
Start: 2025-02-10

## 2025-02-12 RX ORDER — SIMVASTATIN 40 MG
40 TABLET ORAL NIGHTLY
COMMUNITY
Start: 2025-02-03
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
## 2025-02-12 NOTE — ASSESSMENT & PLAN NOTE
-Continue with beta-blocker  -Prior holter monitor few short runs of PACs and some ventricular bigeminy but no malignant arrhythmias.   -Currently tolerable

## 2025-02-12 NOTE — ASSESSMENT & PLAN NOTE
-States he could not tolerate CPAP mask in the past  -Echo shows borderline RV dilation.  No signs of heart failure.  -He has been strongly encouraged to treat ORLY, however, patient declines at this time.  Discussed that untreated sleep apnea directly affects many other systems in the body.  He is still reluctant to treat at this time given previous mask intolerance.

## 2025-02-13 ENCOUNTER — TRANSCRIBE ORDERS (OUTPATIENT)
Dept: ADMINISTRATIVE | Facility: HOSPITAL | Age: 60
End: 2025-02-13
Payer: COMMERCIAL

## 2025-02-13 DIAGNOSIS — R42 LIGHTHEADEDNESS: Primary | ICD-10-CM

## 2025-03-24 ENCOUNTER — TRANSCRIBE ORDERS (OUTPATIENT)
Dept: ADMINISTRATIVE | Facility: HOSPITAL | Age: 60
End: 2025-03-24
Payer: COMMERCIAL

## 2025-03-24 DIAGNOSIS — R42 LIGHTHEADEDNESS: Primary | ICD-10-CM

## 2025-04-11 ENCOUNTER — HOSPITAL ENCOUNTER (OUTPATIENT)
Dept: ULTRASOUND IMAGING | Facility: HOSPITAL | Age: 60
Discharge: HOME | End: 2025-04-11
Admitting: FAMILY MEDICINE
Payer: COMMERCIAL

## 2025-04-11 DIAGNOSIS — R42 LIGHTHEADEDNESS: ICD-10-CM

## 2025-04-11 PROCEDURE — 93880 EXTRACRANIAL BILAT STUDY: CPT

## (undated) DEVICE — GLV SURG SENSICARE W/ALOE PF LF 6.5 STRL

## (undated) DEVICE — ENDOGATOR AUXILIARY WATER JET CONNECTOR: Brand: ENDOGATOR

## (undated) DEVICE — SUCTION CANISTER, 1500CC, RIGID: Brand: DEROYAL

## (undated) DEVICE — CONMED SCOPE SAVER BITE BLOCK, 20X27 MM: Brand: SCOPE SAVER

## (undated) DEVICE — FRCP BIOP COLD ENDOJAW ALLGTR W/NDL 2.8X2300MM BLU

## (undated) DEVICE — VLV SXN AIR/H2O ORCAPOD3 1P/U STRL

## (undated) DEVICE — YANKAUER,BULB TIP, NO VENT: Brand: ARGYLE

## (undated) DEVICE — SNAR POLYP SENSATION STDOVL 27 240 BX40

## (undated) DEVICE — Device

## (undated) DEVICE — QUICK CATCH IN-LINE SUCTION POLYP TRAP IS USED FOR SUCTION RETRIEVAL OF ENDOSCOPICALLY REMOVED POLYPS.

## (undated) DEVICE — SYR LUER SLPTP 50ML

## (undated) DEVICE — ENDOSCOPY PORT CONNECTOR FOR OLYMPUS® SCOPES: Brand: ERBE

## (undated) DEVICE — HYBRID TUBING/CAP SET FOR OLYMPUS® SCOPES: Brand: ERBE

## (undated) DEVICE — KT ORCA VLV SXN AIR/H2O W/SEAL 1P/U STRL

## (undated) DEVICE — LUBE JELLY PK/2.75GM STRL BX/144